# Patient Record
Sex: MALE | Race: WHITE | NOT HISPANIC OR LATINO | Employment: FULL TIME | URBAN - METROPOLITAN AREA
[De-identification: names, ages, dates, MRNs, and addresses within clinical notes are randomized per-mention and may not be internally consistent; named-entity substitution may affect disease eponyms.]

---

## 2021-01-10 ENCOUNTER — HOSPITAL ENCOUNTER (EMERGENCY)
Facility: HOSPITAL | Age: 61
Discharge: HOME | End: 2021-01-10
Attending: EMERGENCY MEDICINE | Admitting: EMERGENCY MEDICINE

## 2021-01-10 VITALS
RESPIRATION RATE: 14 BRPM | SYSTOLIC BLOOD PRESSURE: 153 MMHG | TEMPERATURE: 97.9 F | HEART RATE: 75 BPM | HEIGHT: 69 IN | DIASTOLIC BLOOD PRESSURE: 99 MMHG | OXYGEN SATURATION: 96 % | BODY MASS INDEX: 28.41 KG/M2 | WEIGHT: 191.8 LBS

## 2021-01-10 DIAGNOSIS — I10 UNCONTROLLED HYPERTENSION: Primary | ICD-10-CM

## 2021-01-10 LAB
ANION GAP SERPL CALCULATED.3IONS-SCNC: 9 MMOL/L (ref 5–15)
BASOPHILS # BLD AUTO: 0.1 10*3/MM3 (ref 0–0.2)
BASOPHILS NFR BLD AUTO: 0.7 % (ref 0–1.5)
BUN SERPL-MCNC: 15 MG/DL (ref 6–20)
BUN/CREAT SERPL: 14.3 (ref 7–25)
CALCIUM SPEC-SCNC: 9.3 MG/DL (ref 8.6–10.5)
CHLORIDE SERPL-SCNC: 100 MMOL/L (ref 98–107)
CO2 SERPL-SCNC: 28 MMOL/L (ref 22–29)
CREAT SERPL-MCNC: 1.05 MG/DL (ref 0.76–1.27)
DEPRECATED RDW RBC AUTO: 46.4 FL (ref 37–54)
EOSINOPHIL # BLD AUTO: 0.1 10*3/MM3 (ref 0–0.4)
EOSINOPHIL NFR BLD AUTO: 1.6 % (ref 0.3–6.2)
ERYTHROCYTE [DISTWIDTH] IN BLOOD BY AUTOMATED COUNT: 14.1 % (ref 12.3–15.4)
GFR SERPL CREATININE-BSD FRML MDRD: 73 ML/MIN/1.73
GLUCOSE SERPL-MCNC: 123 MG/DL (ref 65–99)
HCT VFR BLD AUTO: 52.2 % (ref 37.5–51)
HGB BLD-MCNC: 17.6 G/DL (ref 13–17.7)
LYMPHOCYTES # BLD AUTO: 2 10*3/MM3 (ref 0.7–3.1)
LYMPHOCYTES NFR BLD AUTO: 23.7 % (ref 19.6–45.3)
MCH RBC QN AUTO: 31.9 PG (ref 26.6–33)
MCHC RBC AUTO-ENTMCNC: 33.7 G/DL (ref 31.5–35.7)
MCV RBC AUTO: 94.7 FL (ref 79–97)
MONOCYTES # BLD AUTO: 0.7 10*3/MM3 (ref 0.1–0.9)
MONOCYTES NFR BLD AUTO: 8.1 % (ref 5–12)
NEUTROPHILS NFR BLD AUTO: 5.5 10*3/MM3 (ref 1.7–7)
NEUTROPHILS NFR BLD AUTO: 65.9 % (ref 42.7–76)
NRBC BLD AUTO-RTO: 0 /100 WBC (ref 0–0.2)
PLATELET # BLD AUTO: 249 10*3/MM3 (ref 140–450)
PMV BLD AUTO: 8.3 FL (ref 6–12)
POTASSIUM SERPL-SCNC: 4.2 MMOL/L (ref 3.5–5.2)
RBC # BLD AUTO: 5.51 10*6/MM3 (ref 4.14–5.8)
SODIUM SERPL-SCNC: 137 MMOL/L (ref 136–145)
TROPONIN T SERPL-MCNC: <0.01 NG/ML (ref 0–0.03)
WBC # BLD AUTO: 8.3 10*3/MM3 (ref 3.4–10.8)

## 2021-01-10 PROCEDURE — 85025 COMPLETE CBC W/AUTO DIFF WBC: CPT | Performed by: EMERGENCY MEDICINE

## 2021-01-10 PROCEDURE — 84484 ASSAY OF TROPONIN QUANT: CPT | Performed by: EMERGENCY MEDICINE

## 2021-01-10 PROCEDURE — 99284 EMERGENCY DEPT VISIT MOD MDM: CPT

## 2021-01-10 PROCEDURE — 80048 BASIC METABOLIC PNL TOTAL CA: CPT | Performed by: EMERGENCY MEDICINE

## 2021-01-10 PROCEDURE — 93005 ELECTROCARDIOGRAM TRACING: CPT | Performed by: EMERGENCY MEDICINE

## 2021-01-10 RX ORDER — SODIUM CHLORIDE 0.9 % (FLUSH) 0.9 %
10 SYRINGE (ML) INJECTION AS NEEDED
Status: DISCONTINUED | OUTPATIENT
Start: 2021-01-10 | End: 2021-01-10 | Stop reason: HOSPADM

## 2021-01-10 RX ADMIN — METOPROLOL TARTRATE 25 MG: 25 TABLET, FILM COATED ORAL at 16:23

## 2021-01-10 NOTE — DISCHARGE INSTRUCTIONS
Follow-up with your doctor as scheduled.  Record readings of your blood pressure to report to your doctor at the next visit.  Return for increased pain, shortness of breath, numbness, weakness or any other concerns

## 2021-01-10 NOTE — ED PROVIDER NOTES
"Subjective   History of Present Illness  Elevated blood pressure  60-year-old male states his blood pressure has been out of control.  He has noted some intermittent frontal headaches described as pressure in his head and sometimes throbbing off and on over the last several months as well as some sharp anterior chest pain that he describes as brief and intermittent over the last several months without relieving or exacerbating factors.  States he had both discomforts yesterday and took his blood pressure and it was in the 200s over 100s.  He states he has had history of hypertension but has been off of medication for quite some time and has had no primary care over the last several years.  He reports no thunderclap onset or dyspnea on exertion or exertional chest pain or diaphoresis.  He denies history of coronary artery disease.  He currently denies active headache or chest pain.  Review of Systems   Constitutional: Negative.  Negative for diaphoresis and fever.   Eyes: Negative.    Respiratory: Negative.    Cardiovascular: Positive for chest pain.   Gastrointestinal: Negative.    Genitourinary: Negative.    Musculoskeletal: Negative.    Skin: Negative.    Neurological: Positive for headaches. Negative for dizziness, syncope and weakness.   Psychiatric/Behavioral: Negative.        No past medical history on file.  Hypertension  No Known Allergies    No past surgical history on file.    No family history on file.  Family history of hypertension  Social History     Socioeconomic History   • Marital status: Significant Other     Spouse name: Not on file   • Number of children: Not on file   • Years of education: Not on file   • Highest education level: Not on file     Social history occasionally smokes marijuana; he denies cocaine or amphetamine use  Prior to Admission medications    Not on File     /93   Pulse 93   Temp 97.4 °F (36.3 °C)   Resp 16   Ht 175.3 cm (69\")   Wt 87 kg (191 lb 12.8 oz)   SpO2 96% "   BMI 28.32 kg/m²   I examined the patient using the appropriate personal protective equipment.        Objective   Physical Exam  General: Well-developed well-appearing, no acute distress, alert and appropriate  Eyes: Pupils round and reactive, sclera nonicteric  HEENT: Mucous membranes moist, no mucosal swelling  Neck: Supple, no nuchal rigidity, no lymphadenopathy  Respirations: Respirations nonlabored, equal breath sounds bilaterally, clear lungs  Heart regular rate and rhythm, no murmurs rubs or gallops,   Abdomen soft nontender nondistended, no hepatosplenomegaly, no hernia, no mass, normal bowel sounds,  Extremities no clubbing cyanosis or edema, calves are symmetric and nontender  Neuro cranial nerves grossly intact, no focal limb deficits  Psych oriented, pleasant affect  Skin no rash, brisk cap refill  Procedures           ED Course      My EKG interpretation sinus rhythm, PVC, rate of 83     Results for orders placed or performed during the hospital encounter of 01/10/21   Basic Metabolic Panel    Specimen: Blood   Result Value Ref Range    Glucose 123 (H) 65 - 99 mg/dL    BUN 15 6 - 20 mg/dL    Creatinine 1.05 0.76 - 1.27 mg/dL    Sodium 137 136 - 145 mmol/L    Potassium 4.2 3.5 - 5.2 mmol/L    Chloride 100 98 - 107 mmol/L    CO2 28.0 22.0 - 29.0 mmol/L    Calcium 9.3 8.6 - 10.5 mg/dL    eGFR Non African Amer 73 >60 mL/min/1.73    BUN/Creatinine Ratio 14.3 7.0 - 25.0    Anion Gap 9.0 5.0 - 15.0 mmol/L   Troponin    Specimen: Blood   Result Value Ref Range    Troponin T <0.010 0.000 - 0.030 ng/mL   CBC Auto Differential    Specimen: Blood   Result Value Ref Range    WBC 8.30 3.40 - 10.80 10*3/mm3    RBC 5.51 4.14 - 5.80 10*6/mm3    Hemoglobin 17.6 13.0 - 17.7 g/dL    Hematocrit 52.2 (H) 37.5 - 51.0 %    MCV 94.7 79.0 - 97.0 fL    MCH 31.9 26.6 - 33.0 pg    MCHC 33.7 31.5 - 35.7 g/dL    RDW 14.1 12.3 - 15.4 %    RDW-SD 46.4 37.0 - 54.0 fl    MPV 8.3 6.0 - 12.0 fL    Platelets 249 140 - 450 10*3/mm3     Neutrophil % 65.9 42.7 - 76.0 %    Lymphocyte % 23.7 19.6 - 45.3 %    Monocyte % 8.1 5.0 - 12.0 %    Eosinophil % 1.6 0.3 - 6.2 %    Basophil % 0.7 0.0 - 1.5 %    Neutrophils, Absolute 5.50 1.70 - 7.00 10*3/mm3    Lymphocytes, Absolute 2.00 0.70 - 3.10 10*3/mm3    Monocytes, Absolute 0.70 0.10 - 0.90 10*3/mm3    Eosinophils, Absolute 0.10 0.00 - 0.40 10*3/mm3    Basophils, Absolute 0.10 0.00 - 0.20 10*3/mm3    nRBC 0.0 0.0 - 0.2 /100 WBC   ECG 12 Lead   Result Value Ref Range    QT Interval 393 ms                                     MDM  Patient is describing symptoms of some uncontrolled hypertension.  He is not describing active chest pain or headache on this evaluation.  He is not describing symptoms of TIA or stroke.  His EKG shows no acute ischemic change and his troponin was normal.  He was given metoprolol his blood pressure was improving is resting comfortably on reexamination.  He states he does have a scheduled appointment with a primary care physician on the 20th of this month.  He is prescribed metoprolol 25 mg twice daily he is discharged good condition and given warning signs for return.  He voiced understanding to the plan.  Final diagnoses:   Uncontrolled hypertension            Boy Lamb MD  01/10/21 5259

## 2021-01-12 LAB — QT INTERVAL: 393 MS

## 2021-12-14 ENCOUNTER — HOSPITAL ENCOUNTER (INPATIENT)
Facility: HOSPITAL | Age: 61
LOS: 2 days | Discharge: HOME | End: 2021-12-16
Attending: EMERGENCY MEDICINE | Admitting: HOSPITALIST

## 2021-12-14 ENCOUNTER — APPOINTMENT (OUTPATIENT)
Dept: CT IMAGING | Facility: HOSPITAL | Age: 61
End: 2021-12-14

## 2021-12-14 ENCOUNTER — APPOINTMENT (OUTPATIENT)
Dept: GENERAL RADIOLOGY | Facility: HOSPITAL | Age: 61
End: 2021-12-14

## 2021-12-14 DIAGNOSIS — R09.02 HYPOXIA: ICD-10-CM

## 2021-12-14 DIAGNOSIS — R53.1 WEAKNESS: ICD-10-CM

## 2021-12-14 DIAGNOSIS — U07.1 PNEUMONIA DUE TO COVID-19 VIRUS: Primary | ICD-10-CM

## 2021-12-14 DIAGNOSIS — J12.82 PNEUMONIA DUE TO COVID-19 VIRUS: Primary | ICD-10-CM

## 2021-12-14 PROBLEM — I10 HYPERTENSIVE DISORDER: Status: ACTIVE | Noted: 2021-01-20

## 2021-12-14 PROBLEM — E87.1 HYPONATREMIA: Status: ACTIVE | Noted: 2021-12-14

## 2021-12-14 PROBLEM — H16.001 CORNEAL ULCER OF RIGHT EYE: Status: ACTIVE | Noted: 2021-08-05

## 2021-12-14 PROBLEM — B35.1 ONYCHOMYCOSIS: Status: ACTIVE | Noted: 2021-04-01

## 2021-12-14 PROBLEM — E83.51 HYPOCALCEMIA: Status: ACTIVE | Noted: 2021-12-14

## 2021-12-14 LAB
ALBUMIN SERPL-MCNC: 3.4 G/DL (ref 3.5–5.2)
ALBUMIN/GLOB SERPL: 1 G/DL
ALP SERPL-CCNC: 107 U/L (ref 39–117)
ALT SERPL W P-5'-P-CCNC: 41 U/L (ref 1–41)
ANION GAP SERPL CALCULATED.3IONS-SCNC: 13 MMOL/L (ref 5–15)
AST SERPL-CCNC: 46 U/L (ref 1–40)
BASOPHILS # BLD AUTO: 0 10*3/MM3 (ref 0–0.2)
BASOPHILS NFR BLD AUTO: 0.3 % (ref 0–1.5)
BILIRUB SERPL-MCNC: 0.7 MG/DL (ref 0–1.2)
BUN SERPL-MCNC: 11 MG/DL (ref 6–20)
BUN/CREAT SERPL: 15.1 (ref 7–25)
CA-I SERPL ISE-MCNC: 1.11 MMOL/L (ref 1.2–1.3)
CALCIUM SPEC-SCNC: 8.3 MG/DL (ref 8.6–10.5)
CHLORIDE SERPL-SCNC: 93 MMOL/L (ref 98–107)
CHOLEST SERPL-MCNC: 96 MG/DL (ref 0–200)
CO2 SERPL-SCNC: 25 MMOL/L (ref 22–29)
CREAT SERPL-MCNC: 0.73 MG/DL (ref 0.76–1.27)
CRP SERPL-MCNC: 11.44 MG/DL (ref 0–0.5)
D DIMER PPP FEU-MCNC: 0.93 MG/L (FEU) (ref 0–0.59)
D-LACTATE SERPL-SCNC: 1.3 MMOL/L (ref 0.5–2)
DEPRECATED RDW RBC AUTO: 52.5 FL (ref 37–54)
EOSINOPHIL # BLD AUTO: 0 10*3/MM3 (ref 0–0.4)
EOSINOPHIL NFR BLD AUTO: 0 % (ref 0.3–6.2)
ERYTHROCYTE [DISTWIDTH] IN BLOOD BY AUTOMATED COUNT: 15.6 % (ref 12.3–15.4)
FERRITIN SERPL-MCNC: 920.7 NG/ML (ref 30–400)
GFR SERPL CREATININE-BSD FRML MDRD: 110 ML/MIN/1.73
GLOBULIN UR ELPH-MCNC: 3.3 GM/DL
GLUCOSE SERPL-MCNC: 97 MG/DL (ref 65–99)
HCT VFR BLD AUTO: 55.9 % (ref 37.5–51)
HDLC SERPL-MCNC: 38 MG/DL (ref 40–60)
HGB BLD-MCNC: 18.9 G/DL (ref 13–17.7)
HOLD SPECIMEN: NORMAL
LDLC SERPL CALC-MCNC: 44 MG/DL (ref 0–100)
LDLC/HDLC SERPL: 1.22 {RATIO}
LYMPHOCYTES # BLD AUTO: 0.7 10*3/MM3 (ref 0.7–3.1)
LYMPHOCYTES NFR BLD AUTO: 13.4 % (ref 19.6–45.3)
MAGNESIUM SERPL-MCNC: 2.2 MG/DL (ref 1.6–2.6)
MAGNESIUM SERPL-MCNC: 2.3 MG/DL (ref 1.6–2.6)
MCH RBC QN AUTO: 33.1 PG (ref 26.6–33)
MCHC RBC AUTO-ENTMCNC: 33.8 G/DL (ref 31.5–35.7)
MCV RBC AUTO: 97.9 FL (ref 79–97)
MONOCYTES # BLD AUTO: 0.7 10*3/MM3 (ref 0.1–0.9)
MONOCYTES NFR BLD AUTO: 14.2 % (ref 5–12)
NEUTROPHILS NFR BLD AUTO: 3.5 10*3/MM3 (ref 1.7–7)
NEUTROPHILS NFR BLD AUTO: 72.1 % (ref 42.7–76)
NRBC BLD AUTO-RTO: 0.1 /100 WBC (ref 0–0.2)
OSMOLALITY SERPL: 271 MOSM/KG (ref 275–295)
PLAT MORPH BLD: NORMAL
PLATELET # BLD AUTO: 194 10*3/MM3 (ref 140–450)
PMV BLD AUTO: 8.2 FL (ref 6–12)
POTASSIUM SERPL-SCNC: 5 MMOL/L (ref 3.5–5.2)
PROCALCITONIN SERPL-MCNC: 0.06 NG/ML (ref 0–0.25)
PROT SERPL-MCNC: 6.7 G/DL (ref 6–8.5)
RBC # BLD AUTO: 5.71 10*6/MM3 (ref 4.14–5.8)
RBC MORPH BLD: NORMAL
SARS-COV-2 RNA PNL SPEC NAA+PROBE: DETECTED
SODIUM SERPL-SCNC: 131 MMOL/L (ref 136–145)
TRIGL SERPL-MCNC: 59 MG/DL (ref 0–150)
TROPONIN T SERPL-MCNC: <0.01 NG/ML (ref 0–0.03)
TROPONIN T SERPL-MCNC: <0.01 NG/ML (ref 0–0.03)
VLDLC SERPL-MCNC: 14 MG/DL (ref 5–40)
WBC MORPH BLD: NORMAL
WBC NRBC COR # BLD: 4.9 10*3/MM3 (ref 3.4–10.8)
WHOLE BLOOD HOLD SPECIMEN: NORMAL

## 2021-12-14 PROCEDURE — 96375 TX/PRO/DX INJ NEW DRUG ADDON: CPT

## 2021-12-14 PROCEDURE — 85379 FIBRIN DEGRADATION QUANT: CPT | Performed by: NURSE PRACTITIONER

## 2021-12-14 PROCEDURE — 82728 ASSAY OF FERRITIN: CPT | Performed by: NURSE PRACTITIONER

## 2021-12-14 PROCEDURE — 25010000002 ENOXAPARIN PER 10 MG: Performed by: NURSE PRACTITIONER

## 2021-12-14 PROCEDURE — 99284 EMERGENCY DEPT VISIT MOD MDM: CPT

## 2021-12-14 PROCEDURE — 83605 ASSAY OF LACTIC ACID: CPT | Performed by: NURSE PRACTITIONER

## 2021-12-14 PROCEDURE — 86140 C-REACTIVE PROTEIN: CPT | Performed by: NURSE PRACTITIONER

## 2021-12-14 PROCEDURE — 93005 ELECTROCARDIOGRAM TRACING: CPT | Performed by: EMERGENCY MEDICINE

## 2021-12-14 PROCEDURE — 99221 1ST HOSP IP/OBS SF/LOW 40: CPT | Performed by: NURSE PRACTITIONER

## 2021-12-14 PROCEDURE — 96372 THER/PROPH/DIAG INJ SC/IM: CPT

## 2021-12-14 PROCEDURE — 84484 ASSAY OF TROPONIN QUANT: CPT | Performed by: EMERGENCY MEDICINE

## 2021-12-14 PROCEDURE — 82330 ASSAY OF CALCIUM: CPT | Performed by: NURSE PRACTITIONER

## 2021-12-14 PROCEDURE — 25010000002 DEXAMETHASONE PER 1 MG: Performed by: EMERGENCY MEDICINE

## 2021-12-14 PROCEDURE — 70450 CT HEAD/BRAIN W/O DYE: CPT

## 2021-12-14 PROCEDURE — 80061 LIPID PANEL: CPT | Performed by: NURSE PRACTITIONER

## 2021-12-14 PROCEDURE — G0378 HOSPITAL OBSERVATION PER HR: HCPCS

## 2021-12-14 PROCEDURE — 85025 COMPLETE CBC W/AUTO DIFF WBC: CPT | Performed by: EMERGENCY MEDICINE

## 2021-12-14 PROCEDURE — 71045 X-RAY EXAM CHEST 1 VIEW: CPT

## 2021-12-14 PROCEDURE — 83735 ASSAY OF MAGNESIUM: CPT | Performed by: NURSE PRACTITIONER

## 2021-12-14 PROCEDURE — 84484 ASSAY OF TROPONIN QUANT: CPT | Performed by: NURSE PRACTITIONER

## 2021-12-14 PROCEDURE — 83930 ASSAY OF BLOOD OSMOLALITY: CPT | Performed by: NURSE PRACTITIONER

## 2021-12-14 PROCEDURE — 36415 COLL VENOUS BLD VENIPUNCTURE: CPT | Performed by: NURSE PRACTITIONER

## 2021-12-14 PROCEDURE — 84145 PROCALCITONIN (PCT): CPT | Performed by: NURSE PRACTITIONER

## 2021-12-14 PROCEDURE — 87040 BLOOD CULTURE FOR BACTERIA: CPT | Performed by: NURSE PRACTITIONER

## 2021-12-14 PROCEDURE — 87635 SARS-COV-2 COVID-19 AMP PRB: CPT | Performed by: EMERGENCY MEDICINE

## 2021-12-14 PROCEDURE — 85007 BL SMEAR W/DIFF WBC COUNT: CPT | Performed by: EMERGENCY MEDICINE

## 2021-12-14 PROCEDURE — 80053 COMPREHEN METABOLIC PANEL: CPT | Performed by: EMERGENCY MEDICINE

## 2021-12-14 RX ORDER — DEXAMETHASONE SODIUM PHOSPHATE 4 MG/ML
6 INJECTION, SOLUTION INTRA-ARTICULAR; INTRALESIONAL; INTRAMUSCULAR; INTRAVENOUS; SOFT TISSUE DAILY
Status: DISCONTINUED | OUTPATIENT
Start: 2021-12-15 | End: 2021-12-16 | Stop reason: HOSPADM

## 2021-12-14 RX ORDER — DEXAMETHASONE SODIUM PHOSPHATE 4 MG/ML
6 INJECTION, SOLUTION INTRA-ARTICULAR; INTRALESIONAL; INTRAMUSCULAR; INTRAVENOUS; SOFT TISSUE ONCE
Status: COMPLETED | OUTPATIENT
Start: 2021-12-14 | End: 2021-12-14

## 2021-12-14 RX ORDER — ONDANSETRON 4 MG/1
4 TABLET, FILM COATED ORAL EVERY 6 HOURS PRN
Status: DISCONTINUED | OUTPATIENT
Start: 2021-12-14 | End: 2021-12-16 | Stop reason: HOSPADM

## 2021-12-14 RX ORDER — ALBUTEROL SULFATE 90 UG/1
2 AEROSOL, METERED RESPIRATORY (INHALATION) EVERY 6 HOURS PRN
Status: DISCONTINUED | OUTPATIENT
Start: 2021-12-14 | End: 2021-12-16 | Stop reason: HOSPADM

## 2021-12-14 RX ORDER — SODIUM CHLORIDE 0.9 % (FLUSH) 0.9 %
10 SYRINGE (ML) INJECTION AS NEEDED
Status: DISCONTINUED | OUTPATIENT
Start: 2021-12-14 | End: 2021-12-16 | Stop reason: HOSPADM

## 2021-12-14 RX ORDER — GUAIFENESIN/DEXTROMETHORPHAN 100-10MG/5
10 SYRUP ORAL EVERY 4 HOURS PRN
Status: DISCONTINUED | OUTPATIENT
Start: 2021-12-14 | End: 2021-12-16 | Stop reason: HOSPADM

## 2021-12-14 RX ORDER — POTASSIUM CHLORIDE 20 MEQ/1
40 TABLET, EXTENDED RELEASE ORAL AS NEEDED
Status: DISCONTINUED | OUTPATIENT
Start: 2021-12-14 | End: 2021-12-16 | Stop reason: HOSPADM

## 2021-12-14 RX ORDER — MAGNESIUM SULFATE 1 G/100ML
1 INJECTION INTRAVENOUS AS NEEDED
Status: DISCONTINUED | OUTPATIENT
Start: 2021-12-14 | End: 2021-12-16 | Stop reason: HOSPADM

## 2021-12-14 RX ORDER — POTASSIUM CHLORIDE 1.5 G/1.77G
40 POWDER, FOR SOLUTION ORAL AS NEEDED
Status: DISCONTINUED | OUTPATIENT
Start: 2021-12-14 | End: 2021-12-16 | Stop reason: HOSPADM

## 2021-12-14 RX ORDER — NITROGLYCERIN 0.4 MG/1
0.4 TABLET SUBLINGUAL
Status: DISCONTINUED | OUTPATIENT
Start: 2021-12-14 | End: 2021-12-16 | Stop reason: HOSPADM

## 2021-12-14 RX ORDER — BENZONATATE 100 MG/1
100 CAPSULE ORAL 3 TIMES DAILY PRN
Status: DISCONTINUED | OUTPATIENT
Start: 2021-12-14 | End: 2021-12-16 | Stop reason: HOSPADM

## 2021-12-14 RX ORDER — SODIUM CHLORIDE 0.9 % (FLUSH) 0.9 %
10 SYRINGE (ML) INJECTION EVERY 12 HOURS SCHEDULED
Status: DISCONTINUED | OUTPATIENT
Start: 2021-12-14 | End: 2021-12-16 | Stop reason: HOSPADM

## 2021-12-14 RX ORDER — ACETAMINOPHEN 325 MG/1
650 TABLET ORAL EVERY 4 HOURS PRN
Status: DISCONTINUED | OUTPATIENT
Start: 2021-12-14 | End: 2021-12-16 | Stop reason: HOSPADM

## 2021-12-14 RX ORDER — MAGNESIUM SULFATE HEPTAHYDRATE 40 MG/ML
2 INJECTION, SOLUTION INTRAVENOUS AS NEEDED
Status: DISCONTINUED | OUTPATIENT
Start: 2021-12-14 | End: 2021-12-16 | Stop reason: HOSPADM

## 2021-12-14 RX ORDER — ACETAMINOPHEN 160 MG/5ML
650 SOLUTION ORAL EVERY 4 HOURS PRN
Status: DISCONTINUED | OUTPATIENT
Start: 2021-12-14 | End: 2021-12-16 | Stop reason: HOSPADM

## 2021-12-14 RX ORDER — ACETAMINOPHEN 650 MG/1
650 SUPPOSITORY RECTAL EVERY 4 HOURS PRN
Status: DISCONTINUED | OUTPATIENT
Start: 2021-12-14 | End: 2021-12-16 | Stop reason: HOSPADM

## 2021-12-14 RX ORDER — DEXAMETHASONE 4 MG/1
6 TABLET ORAL DAILY
Status: DISCONTINUED | OUTPATIENT
Start: 2021-12-15 | End: 2021-12-16 | Stop reason: HOSPADM

## 2021-12-14 RX ORDER — ONDANSETRON 2 MG/ML
4 INJECTION INTRAMUSCULAR; INTRAVENOUS EVERY 6 HOURS PRN
Status: DISCONTINUED | OUTPATIENT
Start: 2021-12-14 | End: 2021-12-16 | Stop reason: HOSPADM

## 2021-12-14 RX ADMIN — SODIUM CHLORIDE, PRESERVATIVE FREE 10 ML: 5 INJECTION INTRAVENOUS at 21:18

## 2021-12-14 RX ADMIN — DEXAMETHASONE SODIUM PHOSPHATE 6 MG: 4 INJECTION, SOLUTION INTRA-ARTICULAR; INTRALESIONAL; INTRAMUSCULAR; INTRAVENOUS; SOFT TISSUE at 18:55

## 2021-12-14 RX ADMIN — ENOXAPARIN SODIUM 40 MG: 40 INJECTION SUBCUTANEOUS at 21:17

## 2021-12-14 RX ADMIN — SODIUM CHLORIDE 1000 ML: 9 INJECTION, SOLUTION INTRAVENOUS at 16:28

## 2021-12-14 NOTE — ED PROVIDER NOTES
"Subjective   History of Present Illness  Exposure to Covid, general weakness  61-year-old male states had some increasing general weakness, decreased appetite decreased smell and taste over the last 2 days after his wife had Covid last week.  He states he felt slightly feverish.  States he is also had some difficulty walking due to some weakness mainly on the left side arm and leg weakness for the last 2 days.  He reports no stiff neck or photophobia.  He has had cough without shortness of breath.  Review of Systems   Constitutional: Positive for fatigue and fever.   HENT: Negative.    Eyes: Negative.    Respiratory: Positive for cough.    Cardiovascular: Negative.    Gastrointestinal: Negative.    Genitourinary: Negative.    Musculoskeletal: Negative.    Skin: Negative.    Neurological: Positive for weakness. Negative for headaches.   Psychiatric/Behavioral: Negative.        No past medical history on file.  Hypertension  No Known Allergies    No past surgical history on file.    No family history on file.    Social History     Socioeconomic History   • Marital status:    Uses vape    Prior to Admission medications    Medication Sig Start Date End Date Taking? Authorizing Provider   metoprolol tartrate (LOPRESSOR) 25 MG tablet Take 1 tablet by mouth 2 (Two) Times a Day. 1/10/21   Boy Lamb MD     /82 (BP Location: Left arm, Patient Position: Lying)   Pulse 82   Temp 98.9 °F (37.2 °C) (Oral)   Resp 18   Ht 182.9 cm (72\")   Wt 86.2 kg (190 lb)   SpO2 93%   BMI 25.77 kg/m²   I examined the patient using the appropriate personal protective equipment.            Objective   Physical Exam  General: Well-developed well-appearing, no acute distress, alert and appropriate  Eyes: Pupils round and reactive, sclera nonicteric  HEENT: Mucous membranes somewhat dry, no mucosal swelling  Neck: Supple, no nuchal rigidity, no lymphadenopathy  Respirations: Some coarse breath sounds bilaterally, " respirations mildly tachypneic  Heart regular rate and rhythm, no murmurs rubs or gallops,   Abdomen soft nontender nondistended, no hepatosplenomegaly, no hernia, no mass, normal bowel sounds, no CVA tenderness  Extremities no clubbing cyanosis or edema, calves are symmetric and nontender  Neuro cranial nerves normal bilaterally, equal strength in bilateral upper extremities, weakness in bilateral extremities   Psych oriented, pleasant affect  Skin no rash, brisk cap refill  Procedures           ED Course      Results for orders placed or performed during the hospital encounter of 12/14/21   COVID-19,CEPHEID/BIJAN,COR/DEQUAN/PAD/MATT IN-HOUSE(OR EMERGENT/ADD-ON),NP SWAB IN TRANSPORT MEDIA 3-4 HR TAT, RT-PCR - Swab, Nasopharynx    Specimen: Nasopharynx; Swab   Result Value Ref Range    COVID19 Detected (C) Not Detected - Ref. Range   Comprehensive Metabolic Panel    Specimen: Blood   Result Value Ref Range    Glucose 97 65 - 99 mg/dL    BUN 11 6 - 20 mg/dL    Creatinine 0.73 (L) 0.76 - 1.27 mg/dL    Sodium 131 (L) 136 - 145 mmol/L    Potassium 5.0 3.5 - 5.2 mmol/L    Chloride 93 (L) 98 - 107 mmol/L    CO2 25.0 22.0 - 29.0 mmol/L    Calcium 8.3 (L) 8.6 - 10.5 mg/dL    Total Protein 6.7 6.0 - 8.5 g/dL    Albumin 3.40 (L) 3.50 - 5.20 g/dL    ALT (SGPT) 41 1 - 41 U/L    AST (SGOT) 46 (H) 1 - 40 U/L    Alkaline Phosphatase 107 39 - 117 U/L    Total Bilirubin 0.7 0.0 - 1.2 mg/dL    eGFR Non African Amer 110 >60 mL/min/1.73    Globulin 3.3 gm/dL    A/G Ratio 1.0 g/dL    BUN/Creatinine Ratio 15.1 7.0 - 25.0    Anion Gap 13.0 5.0 - 15.0 mmol/L   Troponin    Specimen: Blood   Result Value Ref Range    Troponin T <0.010 0.000 - 0.030 ng/mL   CBC Auto Differential    Specimen: Blood   Result Value Ref Range    WBC 4.90 3.40 - 10.80 10*3/mm3    RBC 5.71 4.14 - 5.80 10*6/mm3    Hemoglobin 18.9 (H) 13.0 - 17.7 g/dL    Hematocrit 55.9 (H) 37.5 - 51.0 %    MCV 97.9 (H) 79.0 - 97.0 fL    MCH 33.1 (H) 26.6 - 33.0 pg    MCHC 33.8 31.5 -  35.7 g/dL    RDW 15.6 (H) 12.3 - 15.4 %    RDW-SD 52.5 37.0 - 54.0 fl    MPV 8.2 6.0 - 12.0 fL    Platelets 194 140 - 450 10*3/mm3    Neutrophil % 72.1 42.7 - 76.0 %    Lymphocyte % 13.4 (L) 19.6 - 45.3 %    Monocyte % 14.2 (H) 5.0 - 12.0 %    Eosinophil % 0.0 (L) 0.3 - 6.2 %    Basophil % 0.3 0.0 - 1.5 %    Neutrophils, Absolute 3.50 1.70 - 7.00 10*3/mm3    Lymphocytes, Absolute 0.70 0.70 - 3.10 10*3/mm3    Monocytes, Absolute 0.70 0.10 - 0.90 10*3/mm3    Eosinophils, Absolute 0.00 0.00 - 0.40 10*3/mm3    Basophils, Absolute 0.00 0.00 - 0.20 10*3/mm3    nRBC 0.1 0.0 - 0.2 /100 WBC   Scan Slide    Specimen: Blood   Result Value Ref Range    RBC Morphology Normal Normal    WBC Morphology Normal Normal    Platelet Morphology Normal Normal   ECG 12 Lead   Result Value Ref Range    QT Interval 387 ms   Gold Top - SST   Result Value Ref Range    Extra Tube Hold for add-ons.    Light Blue Top   Result Value Ref Range    Extra Tube hold for add-on      CT Head Without Contrast    Result Date: 12/14/2021  No acute intracranial abnormality. There is paranasal sinus opacification running involving the frontal, ethmoid and maxillary sinuses. Brain MRI is more sensitive to evaluate for acute or subacute infarcts and to evaluate for intracranial metastatic disease.  Electronically Signed By-Cheyenne Andersen MD On:12/14/2021 6:06 PM This report was finalized on [id number] by  Cheyenne Andersen MD.    XR Chest 1 View    Result Date: 12/14/2021  Elevation of the right hemidiaphragm with subtle peripheral hazy groundglass opacities and interstitial thickening in both lungs, likely atypical/viral pneumonia.  Electronically Signed By-Keenan Palma MD On:12/14/2021 5:07 PM This report was finalized on [id number] by  Keenan Palma MD.         My EKG interpretation sinus rhythm rate of 79                                      MDM  Upon my initial valuation patient was satting in the 88-89% range on room air and was placed on nasal cannula  support 2 L.  He was positive for Covid.  Chest x-ray shows Covid infiltrate.  CT scan of the head was ordered due to patient's description of some weakness of the left side greater than the right side however on neurologic exam he seems to be generally weak all over without focal or lateralizing deficits.  His CT scan of the head shows no acute intracranial normality.  Due to patient's hypoxia he will be admitted the hospital for further care for his Covid illness.  Notably he has not been vaccinated.  Hospital service was paged for admission.  Final diagnoses:   Pneumonia due to COVID-19 virus   Hypoxia   Weakness       ED Disposition  ED Disposition     ED Disposition Condition Comment    Decision to Admit  Level of Care: Telemetry [5]   Admitting Physician: HARLAN DELGADO [[id number]]            No follow-up provider specified.       Medication List      No changes were made to your prescriptions during this visit.          Boy Lamb MD  12/14/21 4853       Boy Lamb MD  12/14/21 0681

## 2021-12-15 ENCOUNTER — APPOINTMENT (OUTPATIENT)
Dept: CT IMAGING | Facility: HOSPITAL | Age: 61
End: 2021-12-15

## 2021-12-15 ENCOUNTER — APPOINTMENT (OUTPATIENT)
Dept: MRI IMAGING | Facility: HOSPITAL | Age: 61
End: 2021-12-15

## 2021-12-15 ENCOUNTER — APPOINTMENT (OUTPATIENT)
Dept: CARDIOLOGY | Facility: HOSPITAL | Age: 61
End: 2021-12-15

## 2021-12-15 LAB
ALBUMIN SERPL-MCNC: 3.1 G/DL (ref 3.5–5.2)
ALBUMIN/GLOB SERPL: 0.9 G/DL
ALP SERPL-CCNC: 103 U/L (ref 39–117)
ALT SERPL W P-5'-P-CCNC: 38 U/L (ref 1–41)
ANION GAP SERPL CALCULATED.3IONS-SCNC: 12 MMOL/L (ref 5–15)
APTT PPP: 32 SECONDS (ref 24–31)
AST SERPL-CCNC: 45 U/L (ref 1–40)
BASOPHILS # BLD AUTO: 0 10*3/MM3 (ref 0–0.2)
BASOPHILS NFR BLD AUTO: 0.3 % (ref 0–1.5)
BH CV LOWER VASCULAR LEFT COMMON FEMORAL AUGMENT: NORMAL
BH CV LOWER VASCULAR LEFT COMMON FEMORAL COMPETENT: NORMAL
BH CV LOWER VASCULAR LEFT COMMON FEMORAL COMPRESS: NORMAL
BH CV LOWER VASCULAR LEFT COMMON FEMORAL PHASIC: NORMAL
BH CV LOWER VASCULAR LEFT COMMON FEMORAL SPONT: NORMAL
BH CV LOWER VASCULAR LEFT DISTAL FEMORAL COMPRESS: NORMAL
BH CV LOWER VASCULAR LEFT GASTRONEMIUS COMPRESS: NORMAL
BH CV LOWER VASCULAR LEFT GREATER SAPH AK COMPRESS: NORMAL
BH CV LOWER VASCULAR LEFT GREATER SAPH BK COMPRESS: NORMAL
BH CV LOWER VASCULAR LEFT LESSER SAPH COMPRESS: NORMAL
BH CV LOWER VASCULAR LEFT MID FEMORAL AUGMENT: NORMAL
BH CV LOWER VASCULAR LEFT MID FEMORAL COMPETENT: NORMAL
BH CV LOWER VASCULAR LEFT MID FEMORAL COMPRESS: NORMAL
BH CV LOWER VASCULAR LEFT MID FEMORAL PHASIC: NORMAL
BH CV LOWER VASCULAR LEFT MID FEMORAL SPONT: NORMAL
BH CV LOWER VASCULAR LEFT PERONEAL COMPRESS: NORMAL
BH CV LOWER VASCULAR LEFT POPLITEAL AUGMENT: NORMAL
BH CV LOWER VASCULAR LEFT POPLITEAL COMPETENT: NORMAL
BH CV LOWER VASCULAR LEFT POPLITEAL COMPRESS: NORMAL
BH CV LOWER VASCULAR LEFT POPLITEAL PHASIC: NORMAL
BH CV LOWER VASCULAR LEFT POPLITEAL SPONT: NORMAL
BH CV LOWER VASCULAR LEFT POSTERIOR TIBIAL COMPRESS: NORMAL
BH CV LOWER VASCULAR LEFT PROXIMAL FEMORAL COMPRESS: NORMAL
BH CV LOWER VASCULAR LEFT SAPHENOFEMORAL JUNCTION COMPRESS: NORMAL
BH CV LOWER VASCULAR RIGHT COMMON FEMORAL AUGMENT: NORMAL
BH CV LOWER VASCULAR RIGHT COMMON FEMORAL COMPETENT: NORMAL
BH CV LOWER VASCULAR RIGHT COMMON FEMORAL COMPRESS: NORMAL
BH CV LOWER VASCULAR RIGHT COMMON FEMORAL PHASIC: NORMAL
BH CV LOWER VASCULAR RIGHT COMMON FEMORAL SPONT: NORMAL
BH CV LOWER VASCULAR RIGHT DISTAL FEMORAL COMPRESS: NORMAL
BH CV LOWER VASCULAR RIGHT GASTRONEMIUS COMPRESS: NORMAL
BH CV LOWER VASCULAR RIGHT GREATER SAPH AK COMPRESS: NORMAL
BH CV LOWER VASCULAR RIGHT GREATER SAPH BK COMPRESS: NORMAL
BH CV LOWER VASCULAR RIGHT LESSER SAPH COMPRESS: NORMAL
BH CV LOWER VASCULAR RIGHT MID FEMORAL AUGMENT: NORMAL
BH CV LOWER VASCULAR RIGHT MID FEMORAL COMPETENT: NORMAL
BH CV LOWER VASCULAR RIGHT MID FEMORAL COMPRESS: NORMAL
BH CV LOWER VASCULAR RIGHT MID FEMORAL PHASIC: NORMAL
BH CV LOWER VASCULAR RIGHT MID FEMORAL SPONT: NORMAL
BH CV LOWER VASCULAR RIGHT PERONEAL COMPRESS: NORMAL
BH CV LOWER VASCULAR RIGHT POPLITEAL AUGMENT: NORMAL
BH CV LOWER VASCULAR RIGHT POPLITEAL COMPETENT: NORMAL
BH CV LOWER VASCULAR RIGHT POPLITEAL COMPRESS: NORMAL
BH CV LOWER VASCULAR RIGHT POPLITEAL PHASIC: NORMAL
BH CV LOWER VASCULAR RIGHT POPLITEAL SPONT: NORMAL
BH CV LOWER VASCULAR RIGHT POSTERIOR TIBIAL COMPRESS: NORMAL
BH CV LOWER VASCULAR RIGHT PROXIMAL FEMORAL COMPRESS: NORMAL
BH CV LOWER VASCULAR RIGHT SAPHENOFEMORAL JUNCTION COMPRESS: NORMAL
BILIRUB SERPL-MCNC: 0.6 MG/DL (ref 0–1.2)
BUN SERPL-MCNC: 13 MG/DL (ref 6–20)
BUN/CREAT SERPL: 19.7 (ref 7–25)
CALCIUM SPEC-SCNC: 8.3 MG/DL (ref 8.6–10.5)
CHLORIDE SERPL-SCNC: 98 MMOL/L (ref 98–107)
CO2 SERPL-SCNC: 23 MMOL/L (ref 22–29)
CREAT SERPL-MCNC: 0.66 MG/DL (ref 0.76–1.27)
DEPRECATED RDW RBC AUTO: 50.8 FL (ref 37–54)
EOSINOPHIL # BLD AUTO: 0 10*3/MM3 (ref 0–0.4)
EOSINOPHIL NFR BLD AUTO: 0 % (ref 0.3–6.2)
ERYTHROCYTE [DISTWIDTH] IN BLOOD BY AUTOMATED COUNT: 15.2 % (ref 12.3–15.4)
FERRITIN SERPL-MCNC: 881 NG/ML (ref 30–400)
GFR SERPL CREATININE-BSD FRML MDRD: 124 ML/MIN/1.73
GLOBULIN UR ELPH-MCNC: 3.6 GM/DL
GLUCOSE SERPL-MCNC: 113 MG/DL (ref 65–99)
HCT VFR BLD AUTO: 56.4 % (ref 37.5–51)
HGB BLD-MCNC: 19 G/DL (ref 13–17.7)
INR PPP: 0.96 (ref 0.93–1.1)
L PNEUMO1 AG UR QL IA: NEGATIVE
LDH SERPL-CCNC: 287 U/L (ref 135–225)
LYMPHOCYTES # BLD AUTO: 0.7 10*3/MM3 (ref 0.7–3.1)
LYMPHOCYTES NFR BLD AUTO: 21.1 % (ref 19.6–45.3)
MACROCYTES BLD QL SMEAR: NORMAL
MAGNESIUM SERPL-MCNC: 2.4 MG/DL (ref 1.6–2.6)
MAXIMAL PREDICTED HEART RATE: 161 BPM
MCH RBC QN AUTO: 33.1 PG (ref 26.6–33)
MCHC RBC AUTO-ENTMCNC: 33.7 G/DL (ref 31.5–35.7)
MCV RBC AUTO: 98 FL (ref 79–97)
MONOCYTES # BLD AUTO: 0.6 10*3/MM3 (ref 0.1–0.9)
MONOCYTES NFR BLD AUTO: 16.6 % (ref 5–12)
NEUTROPHILS NFR BLD AUTO: 2.2 10*3/MM3 (ref 1.7–7)
NEUTROPHILS NFR BLD AUTO: 62 % (ref 42.7–76)
NRBC BLD AUTO-RTO: 0.2 /100 WBC (ref 0–0.2)
NT-PROBNP SERPL-MCNC: 151.5 PG/ML (ref 0–900)
OSMOLALITY UR: 564 MOSM/KG (ref 300–800)
PLATELET # BLD AUTO: 198 10*3/MM3 (ref 140–450)
PMV BLD AUTO: 8.3 FL (ref 6–12)
POLYCHROMASIA BLD QL SMEAR: NORMAL
POTASSIUM SERPL-SCNC: 5.3 MMOL/L (ref 3.5–5.2)
POTASSIUM SERPL-SCNC: 5.4 MMOL/L (ref 3.5–5.2)
PROT SERPL-MCNC: 6.7 G/DL (ref 6–8.5)
PROTHROMBIN TIME: 10.7 SECONDS (ref 9.6–11.7)
RBC # BLD AUTO: 5.75 10*6/MM3 (ref 4.14–5.8)
S PNEUM AG SPEC QL LA: NEGATIVE
SMALL PLATELETS BLD QL SMEAR: ADEQUATE
SODIUM SERPL-SCNC: 133 MMOL/L (ref 136–145)
SODIUM UR-SCNC: 69 MMOL/L
STRESS TARGET HR: 137 BPM
WBC MORPH BLD: NORMAL
WBC NRBC COR # BLD: 3.5 10*3/MM3 (ref 3.4–10.8)

## 2021-12-15 PROCEDURE — G0378 HOSPITAL OBSERVATION PER HR: HCPCS

## 2021-12-15 PROCEDURE — 83935 ASSAY OF URINE OSMOLALITY: CPT | Performed by: NURSE PRACTITIONER

## 2021-12-15 PROCEDURE — 93970 EXTREMITY STUDY: CPT

## 2021-12-15 PROCEDURE — 36415 COLL VENOUS BLD VENIPUNCTURE: CPT | Performed by: HOSPITALIST

## 2021-12-15 PROCEDURE — 70551 MRI BRAIN STEM W/O DYE: CPT

## 2021-12-15 PROCEDURE — 85610 PROTHROMBIN TIME: CPT | Performed by: NURSE PRACTITIONER

## 2021-12-15 PROCEDURE — 71275 CT ANGIOGRAPHY CHEST: CPT

## 2021-12-15 PROCEDURE — 87205 SMEAR GRAM STAIN: CPT | Performed by: NURSE PRACTITIONER

## 2021-12-15 PROCEDURE — 96372 THER/PROPH/DIAG INJ SC/IM: CPT

## 2021-12-15 PROCEDURE — 87899 AGENT NOS ASSAY W/OPTIC: CPT | Performed by: NURSE PRACTITIONER

## 2021-12-15 PROCEDURE — 96375 TX/PRO/DX INJ NEW DRUG ADDON: CPT

## 2021-12-15 PROCEDURE — 83735 ASSAY OF MAGNESIUM: CPT | Performed by: NURSE PRACTITIONER

## 2021-12-15 PROCEDURE — 25010000005 REMDESIVIR 100 MG/20ML SOLUTION

## 2021-12-15 PROCEDURE — 83880 ASSAY OF NATRIURETIC PEPTIDE: CPT | Performed by: NURSE PRACTITIONER

## 2021-12-15 PROCEDURE — 99233 SBSQ HOSP IP/OBS HIGH 50: CPT | Performed by: HOSPITALIST

## 2021-12-15 PROCEDURE — 85730 THROMBOPLASTIN TIME PARTIAL: CPT | Performed by: NURSE PRACTITIONER

## 2021-12-15 PROCEDURE — 85025 COMPLETE CBC W/AUTO DIFF WBC: CPT | Performed by: NURSE PRACTITIONER

## 2021-12-15 PROCEDURE — 0 IOPAMIDOL PER 1 ML: Performed by: EMERGENCY MEDICINE

## 2021-12-15 PROCEDURE — 87070 CULTURE OTHR SPECIMN AEROBIC: CPT | Performed by: NURSE PRACTITIONER

## 2021-12-15 PROCEDURE — 84132 ASSAY OF SERUM POTASSIUM: CPT | Performed by: HOSPITALIST

## 2021-12-15 PROCEDURE — 93005 ELECTROCARDIOGRAM TRACING: CPT | Performed by: HOSPITALIST

## 2021-12-15 PROCEDURE — 25010000002 CALCIUM GLUCONATE-NACL 1-0.675 GM/50ML-% SOLUTION: Performed by: HOSPITALIST

## 2021-12-15 PROCEDURE — 82728 ASSAY OF FERRITIN: CPT | Performed by: NURSE PRACTITIONER

## 2021-12-15 PROCEDURE — 85007 BL SMEAR W/DIFF WBC COUNT: CPT | Performed by: NURSE PRACTITIONER

## 2021-12-15 PROCEDURE — 96376 TX/PRO/DX INJ SAME DRUG ADON: CPT

## 2021-12-15 PROCEDURE — 83615 LACTATE (LD) (LDH) ENZYME: CPT | Performed by: NURSE PRACTITIONER

## 2021-12-15 PROCEDURE — 25010000002 DEXAMETHASONE PER 1 MG: Performed by: NURSE PRACTITIONER

## 2021-12-15 PROCEDURE — 25010000002 ENOXAPARIN PER 10 MG: Performed by: NURSE PRACTITIONER

## 2021-12-15 PROCEDURE — 84300 ASSAY OF URINE SODIUM: CPT | Performed by: NURSE PRACTITIONER

## 2021-12-15 PROCEDURE — 96365 THER/PROPH/DIAG IV INF INIT: CPT

## 2021-12-15 PROCEDURE — 80053 COMPREHEN METABOLIC PANEL: CPT | Performed by: NURSE PRACTITIONER

## 2021-12-15 PROCEDURE — 97161 PT EVAL LOW COMPLEX 20 MIN: CPT

## 2021-12-15 RX ORDER — KETOROLAC TROMETHAMINE 30 MG/ML
30 INJECTION, SOLUTION INTRAMUSCULAR; INTRAVENOUS EVERY 6 HOURS PRN
Status: DISCONTINUED | OUTPATIENT
Start: 2021-12-15 | End: 2021-12-16 | Stop reason: HOSPADM

## 2021-12-15 RX ORDER — CALCIUM GLUCONATE 20 MG/ML
1 INJECTION, SOLUTION INTRAVENOUS ONCE
Status: COMPLETED | OUTPATIENT
Start: 2021-12-15 | End: 2021-12-16

## 2021-12-15 RX ORDER — BENZONATATE 100 MG/1
100 CAPSULE ORAL 3 TIMES DAILY PRN
COMMUNITY

## 2021-12-15 RX ORDER — PANTOPRAZOLE SODIUM 40 MG/1
40 TABLET, DELAYED RELEASE ORAL DAILY
COMMUNITY

## 2021-12-15 RX ORDER — AMLODIPINE BESYLATE 5 MG/1
5 TABLET ORAL DAILY
COMMUNITY

## 2021-12-15 RX ADMIN — SODIUM CHLORIDE, PRESERVATIVE FREE 10 ML: 5 INJECTION INTRAVENOUS at 21:00

## 2021-12-15 RX ADMIN — ENOXAPARIN SODIUM 40 MG: 40 INJECTION SUBCUTANEOUS at 15:06

## 2021-12-15 RX ADMIN — CALCIUM GLUCONATE 1 G: 20 INJECTION, SOLUTION INTRAVENOUS at 18:16

## 2021-12-15 RX ADMIN — SODIUM CHLORIDE, PRESERVATIVE FREE 10 ML: 5 INJECTION INTRAVENOUS at 09:46

## 2021-12-15 RX ADMIN — DEXAMETHASONE SODIUM PHOSPHATE 6 MG: 4 INJECTION, SOLUTION INTRA-ARTICULAR; INTRALESIONAL; INTRAMUSCULAR; INTRAVENOUS; SOFT TISSUE at 09:45

## 2021-12-15 RX ADMIN — IOPAMIDOL 100 ML: 755 INJECTION, SOLUTION INTRAVENOUS at 00:28

## 2021-12-15 NOTE — PROGRESS NOTES
[facility] Medicine Services        Patient Name: Kentrell Cleaning  : 1960  MRN: [id number]  Primary Care Physician:  Provider, No Known  Date of admission: 2021        Subjective       Chief Complaint: weakness     History of Present Illness: Kentrell Cleaning is a 61 y.o. male with Past medical history of hypertension who presented to [facility] on 2021 complaining of some increasing general weakness, decreased appetite decreased smell and taste over the last 2 days after his wife had Covid last week.  Patient complains of generalized weakness, with the left arm and leg weaker than the right.  He complains of mid back pain for the past 3 days, describes as an immediate dull ache.  Patient rates back pain 9 on sliding scale 0-10.  Patient also complains of fatigue, difficulty relating, loss of appetite, and loss of taste and smell.  Patient denies chest pain, shortness of air, cough, nausea, fever, chills.     In the ED, CT head showed no acute intracranial abnormality; there is paranasal sinus opacification running involving the frontal, ethmoid and maxillary sinuses; brain MRI is more sensitive.  Chest x-ray showed elevation of the right hemidiaphragm with subtle peripheral hazy groundglass opacities and interstitial thickening in both lungs, likely atypical/viral pneumonia.  EKG showed sinus rhythm.  COVID-19 positive.  All labs unremarkable except sodium 131, calcium 8.3, AST 46.  All vital signs unremarkable.  Patient received Decadron, IV fluid bolus in the ED.  Patient admitted to hospitalist service for further evaluation and treatment.   Hospital course;  12/15/2021; patient denies any left upper or lower extremity weakness, no speech or swallow issues, hemodynamically stable, elevated D-dimer, will check venous Dopplers bilateral lower extremity, neurology has been consulted await MRI of the brain.  Review of Systems   Constitutional: Positive for decreased  appetite and malaise/fatigue. Negative for chills and fever.   HENT: Negative.    Eyes: Negative.    Cardiovascular: Negative.  Negative for chest pain.   Respiratory: Negative.  Negative for cough.    Endocrine: Negative.    Skin: Negative.    Musculoskeletal: Positive for back pain.   Gastrointestinal: Negative.  Negative for nausea.   Genitourinary: Negative.    Neurological: Positive for weakness.   Psychiatric/Behavioral: Negative.    Allergic/Immunologic: Negative.          Personal History      Medical History   No past medical history on file.        Surgical History   No past surgical history on file.        Family History: family history is not on file. Otherwise pertinent FHx was reviewed and not pertinent to current issue.     Social History:       Home Medications:           Prior to Admission Medications      Prescriptions Last Dose Informant Patient Reported? Taking?     metoprolol tartrate (LOPRESSOR) 25 MG tablet     No No     Take 1 tablet by mouth 2 (Two) Times a Day.                Allergies:  No Known Allergies     Objective       Vitals:   Temp:  [97.5 °F (36.4 °C)-98.3 °F (36.8 °C)] 97.8 °F (36.6 °C)  Heart Rate:  [78-90] 87  Resp:  [17-18] 17  BP: (124-151)/(82-96) 151/96  Flow (L/min):  [2] 2  Physical Exam  Vitals and nursing note reviewed.   Constitutional:       Appearance: Normal appearance.   HENT:      Head: Normocephalic.      Nose: Nose normal.      Mouth/Throat:      Pharynx: Oropharynx is clear.   Eyes:      Extraocular Movements: Extraocular movements intact.   Cardiovascular:      Rate and Rhythm: Normal rate and regular rhythm.      Pulses: Normal pulses.      Heart sounds: Normal heart sounds.   Pulmonary:      Effort: Pulmonary effort is normal.      Breath sounds: Normal breath sounds.   Abdominal:      General: Bowel sounds are normal.      Palpations: Abdomen is soft.   Musculoskeletal:         General: Normal range of motion.      Cervical back: Normal range of motion.   Bilateral upper and lower extremity strength normal  Skin:     General: Skin is warm and dry.   Neurological:      Mental Status: He is alert and oriented to person, place, and time.   Psychiatric:         Mood and Affect: Mood normal.         Behavior: Behavior normal.            Result Review    Result Review:  I have personally reviewed the results from the time of this admission to 12/14/2021 20:57 EST and agree with these findings:  [x]?  Laboratory  [x]?  Microbiology  [x]?  Radiology  [x]?  EKG/Telemetry   []?  Cardiology/Vascular   []?  Pathology  []?  Old records  []?  Other:  Most notable findings include: As above     Lab Results   Component Value Date    GLUCOSE 113 (H) 12/15/2021    CALCIUM 8.3 (L) 12/15/2021     (L) 12/15/2021    K 5.3 (H) 12/15/2021    CO2 23.0 12/15/2021    CL 98 12/15/2021    BUN 13 12/15/2021    CREATININE 0.66 (L) 12/15/2021    EGFRIFNONA 124 12/15/2021    BCR 19.7 12/15/2021    ANIONGAP 12.0 12/15/2021     Lab Results   Component Value Date    WBC 3.50 12/15/2021    HGB 19.0 (H) 12/15/2021    HCT 56.4 (H) 12/15/2021    MCV 98.0 (H) 12/15/2021     12/15/2021       Assessment/Plan          Active Hospital Problems:       Active Hospital Problems     Diagnosis     • **Pneumonia due to COVID-19 virus     • Hypocalcemia     • Hyponatremia     • Generalized weakness     • Primary hypertension        Plan:        Pneumonia due to COVID-19 virus with hypoxia  -COVID-19 positive  -Chest x-ray reviewed  -EKG reviewed  -AST 46  -Initial troponin negative  -Serial troponins ordered  -Lactic acid normal.  -Blood cultures x2 ordered  -Sputum culture ordered,  -Urine for Legionella and strep pneumo, both negative.  -Ferritin elevated,  procalcitonin normal, CRP elevated at 11.44  -PT/INR, PTT ordered  -BNP ordered  -Magnesium ordered  -Enhanced droplet/contact precautions  -Continuous cardiac monitoring  -Continuous pulse ox  -Decadron given in the ED, continue  -Albuterol inhaler,  Tessalon, Robitussin ordered  -Remdesivir started on 12/14/2021   Elevated D-dimer; CT angiogram of the chest, negative for PE severe emphysema, pulmonary fibrosis in the perihilar and basilar lungs, borderline ascending aortic aneurysm measuring 4 cm in diameter.,  Will check venous Dopplers ultrasound bilateral lower extremity  Generalized weakness/CVA versus TIA  -Patient reports left-sided weakness greater than right  -CT head no acute intracranial abnormality noted  -Fall precautions  -MRI brain ordered, pending, neurology consulted.     Primary hypertension  -BP well-controlled  -Lipid panel ordered     Hypocalcemia  -Calcium 8.3  -Ionized calcium 1.11, replace as per protocol.     Hyponatremia likely dilutional; will fluid restrict to 1500 mL per 24 hours  -Sodium 131--> 133  -Serum osmolality low, urine osmolality normal, urine sodium 69   Hyperkalemia mild; recheck  DVT prophylaxis:  Medical DVT prophylaxis orders are present.     CODE STATUS:    Code Status (Patient has no pulse and is not breathing): CPR (Attempt to Resuscitate)  Medical Interventions (Patient has pulse or is breathing): Full Support     Admission Status:  I believe this patient meets observation status.     I discussed the patient's findings and my recommendations with patient and family.     This patient has been examined wearing appropriate Personal Protective Equipment.    Electronically signed by Parth Light MD, 12/15/21, 2:58 PM EST.

## 2021-12-15 NOTE — PLAN OF CARE
Problem: Breathing Pattern Ineffective  Goal: Effective Breathing Pattern  Outcome: Ongoing, Progressing   Goal Outcome Evaluation:

## 2021-12-15 NOTE — THERAPY EVALUATION
Patient Name: Kentrell Cleaning  : 1960    MRN: [id number]                              Today's Date: 12/15/2021       Admit Date: 2021    Visit Dx:     ICD-10-CM ICD-9-CM   1. Pneumonia due to COVID-19 virus  U07.1 480.8    J12.82 079.89   2. Hypoxia  R09.02 799.02   3. Weakness  R53.1 780.79     Patient Active Problem List   Diagnosis   • Pneumonia due to COVID-19 virus   • Corneal ulcer of right eye   • Primary hypertension   • Onychomycosis   • Hypocalcemia   • Hyponatremia   • Generalized weakness     History reviewed. No pertinent past medical history.  History reviewed. No pertinent surgical history.   General Information     Row Name 12/15/21 1534          Physical Therapy Time and Intention    Document Type evaluation  -CR     Mode of Treatment physical therapy  -CR     Row Name 12/15/21 1534          General Information    Patient Profile Reviewed yes  -CR     Prior Level of Function independent:; community mobility; all household mobility; driving; work  -CR     Existing Precautions/Restrictions no known precautions/restrictions  -CR     Barriers to Rehab none identified  -CR     Row Name 12/15/21 1534          Living Environment    Lives With spouse  -CR     Row Name 12/15/21 1534          Home Main Entrance    Number of Stairs, Main Entrance one  -CR     Row Name 12/15/21 1534          Stairs Within Home, Primary    Number of Stairs, Within Home, Primary none  -CR     Row Name 12/15/21 1534          Cognition    Orientation Status (Cognition) oriented x 4  -CR     Row Name 12/15/21 1534          Safety Issues, Functional Mobility    Impairments Affecting Function (Mobility) shortness of breath; endurance/activity tolerance  -CR           User Key  (r) = Recorded By, (t) = Taken By, (c) = Cosigned By    Initials Name Provider Type    CR Reyes, Carmela, PT Physical Therapist               Mobility     Row Name 12/15/21 1535          Bed Mobility    Bed Mobility bed mobility (all) activities  -CR      All Activities, Elizabeth (Bed Mobility) modified independence  -CR     Row Name 12/15/21 1535          Bed-Chair Transfer    Bed-Chair Elizabeth (Transfers) standby assist  -CR     Row Name 12/15/21 1535          Sit-Stand Transfer    Sit-Stand Elizabeth (Transfers) modified independence  -CR           User Key  (r) = Recorded By, (t) = Taken By, (c) = Cosigned By    Initials Name Provider Type    CR Reyes, Carmela, THEA Physical Therapist               Obj/Interventions     Row Name 12/15/21 1535          Range of Motion Comprehensive    General Range of Motion no range of motion deficits identified  -CR     Row Name 12/15/21 1535          Strength Comprehensive (MMT)    General Manual Muscle Testing (MMT) Assessment no strength deficits identified  -CR     Row Name 12/15/21 1535          Balance    Balance Assessment sitting static balance; standing static balance; standing dynamic balance  -CR     Static Sitting Balance WNL; sitting, edge of bed  -CR     Static Standing Balance WFL  -CR     Dynamic Standing Balance mild impairment; supported  -CR     Row Name 12/15/21 1535          Sensory Assessment (Somatosensory)    Sensory Assessment (Somatosensory) sensation intact  -CR           User Key  (r) = Recorded By, (t) = Taken By, (c) = Cosigned By    Initials Name Provider Type    CR Reyes, Carmela, PT Physical Therapist               Goals/Plan     Row Name 12/15/21 1538          Gait Training Goal 1 (PT)    Activity/Assistive Device (Gait Training Goal 1, PT) gait (walking locomotion); increase energy conservation  -CR     Elizabeth Level (Gait Training Goal 1, PT) modified independence  -CR     Distance (Gait Training Goal 1, PT) 30 ft x 4  -CR     Time Frame (Gait Training Goal 1, PT) 1 week  -CR           User Key  (r) = Recorded By, (t) = Taken By, (c) = Cosigned By    Initials Name Provider Type    CR Reyes, Carmela, PT Physical Therapist               Clinical Impression     Row Name  12/15/21 1538          Pain    Additional Documentation Pain Scale: Numbers Pre/Post-Treatment (Group)  -CR     Row Name 12/15/21 1535          Pain Scale: Numbers Pre/Post-Treatment    Pretreatment Pain Rating 0/10 - no pain  -CR     Posttreatment Pain Rating 0/10 - no pain  -CR     Row Name 12/15/21 1536          Plan of Care Review    Plan of Care Reviewed With patient  -CR     Outcome Summary 60 y/o male came to hospital due to inc weakness, dec sense of smell and taste and found +COVID-19.PMH includes HTN. 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 Could mot ambulate but able to stand for 3 minutes unsupported without loss of balance however noted drop in spO2 to 87%. Will follow up for gait but should be safe to dc home.  -CR     Row Name 12/15/21 1537          Therapy Assessment/Plan (PT)    Patient/Family Therapy Goals Statement (PT) home  -CR     Rehab Potential (PT) good, to achieve stated therapy goals  -CR     Criteria for Skilled Interventions Met (PT) yes; skilled treatment is necessary  -CR     Predicted Duration of Therapy Intervention (PT) dc  -CR           User Key  (r) = Recorded By, (t) = Taken By, (c) = Cosigned By    Initials Name Provider Type    CR Reyes, Carmela, PT Physical Therapist               Outcome Measures     Row Name 12/15/21 1534          How much help from another person do you currently need...    Turning from your back to your side while in flat bed without using bedrails? 4  -CR     Moving from lying on back to sitting on the side of a flat bed without bedrails? 4  -CR     Moving to and from a bed to a chair (including a wheelchair)? 4  -CR     Standing up from a chair using your arms (e.g., wheelchair, bedside chair)? 4  -CR     Climbing 3-5 steps with a railing? 4  -CR     To walk in hospital room? 4  -CR     AM-PAC 6 Clicks Score (PT) 24  -CR     Row Name 12/15/21 6977          Functional Assessment     Outcome Measure Options AM-PAC 6 Clicks Basic Mobility (PT)  -CR           User Key  (r) = Recorded By, (t) = Taken By, (c) = Cosigned By    Initials Name Provider Type    CR Reyes, Carmela, PT Physical Therapist                             Physical Therapy Education                 Title: PT OT SLP Therapies (Done)     Topic: Physical Therapy (Done)     Point: Mobility training (Done)     Learning Progress Summary           Patient Acceptance, E, VU by CR at 12/15/2021 1538                               User Key     Initials Effective Dates Name Provider Type Discipline    CR 06/16/21 -  Reyes, Carmela, PT Physical Therapist PT              PT Recommendation and Plan     Plan of Care Reviewed With: patient  Outcome Summary: 60 y/o male came to hospital due to inc weakness, dec sense of smell and taste and found +COVID-19.PMH includes HTN. 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 Could mot ambulate but able to stand for 3 minutes unsupported without loss of balance however noted drop in spO2 to 87%. Will follow up for gait but should be safe to dc home.     Time Calculation:    PT Charges     Row Name 12/15/21 1539             Time Calculation    Start Time 1342  -CR      Stop Time 1400  -CR      Time Calculation (min) 18 min  -CR      PT Received On 12/15/21  -CR      PT - Next Appointment 12/16/21  -CR      PT Goal Re-Cert Due Date 12/29/21  -CR              Time Calculation- PT    Total Timed Code Minutes- PT 0 minute(s)  -CR            User Key  (r) = Recorded By, (t) = Taken By, (c) = Cosigned By    Initials Name Provider Type    CR Reyes, Carmela, PT Physical Therapist              Therapy Charges for Today     Code Description Service Date Service Provider Modifiers Qty    80329310683 HC PT EVAL LOW COMPLEXITY 3 12/15/2021 Reyes, Carmela, PT GP 1          PT G-Codes  Outcome Measure Options: AM-PAC 6 Clicks Basic Mobility (PT)  AM-PAC 6  Clicks Score (PT): 24    Carmela Reyes, PT  12/15/2021

## 2021-12-15 NOTE — H&P
[facility] Medicine Services      Patient Name: Kentrell Cleaning  : 1960  MRN: [id number]  Primary Care Physician:  Provider, No Known  Date of admission: 2021      Subjective      Chief Complaint: weakness    History of Present Illness: Kentrell Cleaning is a 61 y.o. male with Past medical history of hypertension who presented to [facility] on 2021 complaining of some increasing general weakness, decreased appetite decreased smell and taste over the last 2 days after his wife had Covid last week.  Patient complains of generalized weakness, with the left arm and leg weaker than the right.  He complains of mid back pain for the past 3 days, describes as an immediate dull ache.  Patient rates back pain 9 on sliding scale 0-10.  Patient also complains of fatigue, difficulty relating, loss of appetite, and loss of taste and smell.  Patient denies chest pain, shortness of air, cough, nausea, fever, chills.    In the ED, CT head showed no acute intracranial abnormality; there is paranasal sinus opacification running involving the frontal, ethmoid and maxillary sinuses; brain MRI is more sensitive.  Chest x-ray showed elevation of the right hemidiaphragm with subtle peripheral hazy groundglass opacities and interstitial thickening in both lungs, likely atypical/viral pneumonia.  EKG showed sinus rhythm.  COVID-19 positive.  All labs unremarkable except sodium 131, calcium 8.3, AST 46.  All vital signs unremarkable.  Patient received Decadron, IV fluid bolus in the ED.  Patient admitted to hospitalist service for further evaluation and treatment.    Review of Systems   Constitutional: Positive for decreased appetite and malaise/fatigue. Negative for chills and fever.   HENT: Negative.    Eyes: Negative.    Cardiovascular: Negative.  Negative for chest pain.   Respiratory: Negative.  Negative for cough.    Endocrine: Negative.    Skin: Negative.    Musculoskeletal: Positive for  back pain.   Gastrointestinal: Negative.  Negative for nausea.   Genitourinary: Negative.    Neurological: Positive for weakness.   Psychiatric/Behavioral: Negative.    Allergic/Immunologic: Negative.        Personal History     No past medical history on file.    No past surgical history on file.    Family History: family history is not on file. Otherwise pertinent FHx was reviewed and not pertinent to current issue.    Social History:      Home Medications:  Prior to Admission Medications     Prescriptions Last Dose Informant Patient Reported? Taking?    metoprolol tartrate (LOPRESSOR) 25 MG tablet   No No    Take 1 tablet by mouth 2 (Two) Times a Day.            Allergies:  No Known Allergies    Objective      Vitals:   Temp:  [98.9 °F (37.2 °C)] 98.9 °F (37.2 °C)  Heart Rate:  [82-90] 82  Resp:  [18-19] 18  BP: (115-124)/(82-86) 124/82  Flow (L/min):  [2] 2    Physical Exam  Vitals and nursing note reviewed.   Constitutional:       Appearance: Normal appearance.   HENT:      Head: Normocephalic.      Nose: Nose normal.      Mouth/Throat:      Pharynx: Oropharynx is clear.   Eyes:      Extraocular Movements: Extraocular movements intact.   Cardiovascular:      Rate and Rhythm: Normal rate and regular rhythm.      Pulses: Normal pulses.      Heart sounds: Normal heart sounds.   Pulmonary:      Effort: Pulmonary effort is normal.      Breath sounds: Normal breath sounds.   Abdominal:      General: Bowel sounds are normal.      Palpations: Abdomen is soft.   Musculoskeletal:         General: Normal range of motion.      Cervical back: Normal range of motion.   Skin:     General: Skin is warm and dry.   Neurological:      Mental Status: He is alert and oriented to person, place, and time.   Psychiatric:         Mood and Affect: Mood normal.         Behavior: Behavior normal.          Result Review    Result Review:  I have personally reviewed the results from the time of this admission to 12/14/2021 20:57 EST and  agree with these findings:  [x]  Laboratory  [x]  Microbiology  [x]  Radiology  [x]  EKG/Telemetry   []  Cardiology/Vascular   []  Pathology  []  Old records  []  Other:  Most notable findings include: As above    Assessment/Plan        Active Hospital Problems:  Active Hospital Problems    Diagnosis    • **Pneumonia due to COVID-19 virus    • Hypocalcemia    • Hyponatremia    • Generalized weakness    • Primary hypertension      Plan:    -----Home medication reconciliation not completed.  Will complete home med rec once nursing reviews.-----    Pneumonia due to COVID-19 virus  -COVID-19 positive  -Chest x-ray reviewed  -EKG reviewed  -AST 46  -Initial troponin negative  -Serial troponins ordered  -Lactic ordered  -Blood cultures x2 ordered  -Sputum culture ordered  -Urine for Legionella and strep pneumo ordered  -Ferritin, D-dimer, procalcitonin, CRP ordered  -PT/INR, PTT ordered  -BNP ordered  -Magnesium ordered  -Enhanced droplet/contact precautions  -Continuous cardiac monitoring  -Continuous pulse ox  -Decadron given in the ED, continue  -Albuterol inhaler, Tessalon, Robitussin ordered  -Remdesivir ordered    Generalized weakness  -Patient reports left-sided weakness greater than right  -CT head reviewed  -Fall precautions  -MRI brain ordered    Primary hypertension  -BP well-controlled  -Lipid panel ordered    Hypocalcemia  -Calcium 8.3  -Ionized calcium ordered    Hyponatremia  -Sodium 131  -Serum osmolality, urine osmolality, urine sodium ordered    DVT prophylaxis:  Medical DVT prophylaxis orders are present.    CODE STATUS:    Code Status (Patient has no pulse and is not breathing): CPR (Attempt to Resuscitate)  Medical Interventions (Patient has pulse or is breathing): Full Support    Admission Status:  I believe this patient meets observation status.    I discussed the patient's findings and my recommendations with patient and family.    This patient has been examined wearing appropriate Personal  Protective Equipment. 12/14/21      Signature: Electronically signed by HEIDY Erickson, 12/14/21, 11:56 PM EST.

## 2021-12-15 NOTE — CASE MANAGEMENT/SOCIAL WORK
Discharge Planning Assessment   Edy     Patient Name: Kentrell Cleaning  MRN: [id number]  Today's Date: 12/15/2021    Admit Date: 12/14/2021     Discharge Needs Assessment     Row Name 12/15/21 1331       Living Environment    Lives With spouse    Name(s) of Who Lives With Patient Dixie    Current Living Arrangements home/apartment/condo    Primary Care Provided by self    Provides Primary Care For no one    Family Caregiver if Needed spouse    Family Caregiver Names Dixie    Quality of Family Relationships supportive    Able to Return to Prior Arrangements yes       Resource/Environmental Concerns    Resource/Environmental Concerns none       Transition Planning    Patient/Family Anticipates Transition to home with family    Patient/Family Anticipated Services at Transition none    Transportation Anticipated family or friend will provide  Spouse       Discharge Needs Assessment    Concerns to be Addressed denies needs/concerns at this time    Equipment Needed After Discharge oxygen  [place] for O2 needs    Provided Post Acute Provider List? Yes    Post Acute Provider List DME Supplier    Delivered To Patient    Method of Delivery In person               Discharge Plan     Row Name 12/15/21 8740       Plan    Plan Follow for O2 needs    Plan Comments Spoke with patient at bedside. He currently denies dc needs,but is requiring oxygen. May need O2 at dc. Pharmacy confirmed. Pt is listed as having no PCP in EPIC, but states his PCP is Dr. Aguirre at [facility]. Voicemail left for ER Registration asking them to add Dr. Aguirre as patient's PCP              Continued Care and Services - Admitted Since 12/14/2021    Coordination has not been started for this encounter.          Demographic Summary     Row Name 12/15/21 0495       General Information    Admission Type observation    Referral Source admission list    Preferred Language English       Contact Information    Permission Granted to Share Info With case  manager               Functional Status     Row Name 12/15/21 1330       Functional Status    Usual Activity Tolerance good    Current Activity Tolerance moderate       Functional Status, IADL    Medications independent    Meal Preparation independent    Housekeeping independent    Laundry independent    Shopping independent                Ivone Masters RN, [facility]  Office: [phone]  Fax: [phone]  [email or web address]      I met with patient in room wearing PPE: mask and goggles.     Maintained distance greater than six feet and spent </=15 minutes in the room      Ivone Masters RN

## 2021-12-15 NOTE — PLAN OF CARE
Goal Outcome Evaluation:  Plan of Care Reviewed With: patient           Outcome Summary: 60 y/o male came to hospital due to inc weakness, dec sense of smell and taste and found +COVID-19.PMH includes HTN. 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 Could mot ambulate but able to stand for 3 minutes unsupported without loss of balance however noted drop in spO2 to 87%. Will follow up for gait but should be safe to dc home.

## 2021-12-16 ENCOUNTER — READMISSION MANAGEMENT (OUTPATIENT)
Dept: CALL CENTER | Facility: HOSPITAL | Age: 61
End: 2021-12-16

## 2021-12-16 VITALS
HEART RATE: 73 BPM | BODY MASS INDEX: 25.73 KG/M2 | RESPIRATION RATE: 14 BRPM | OXYGEN SATURATION: 92 % | HEIGHT: 72 IN | SYSTOLIC BLOOD PRESSURE: 148 MMHG | TEMPERATURE: 97.6 F | WEIGHT: 190 LBS | DIASTOLIC BLOOD PRESSURE: 89 MMHG

## 2021-12-16 PROBLEM — E87.1 HYPONATREMIA: Status: RESOLVED | Noted: 2021-12-14 | Resolved: 2021-12-16

## 2021-12-16 PROBLEM — R53.1 GENERALIZED WEAKNESS: Status: RESOLVED | Noted: 2021-12-14 | Resolved: 2021-12-16

## 2021-12-16 LAB
ALBUMIN SERPL-MCNC: 2.8 G/DL (ref 3.5–5.2)
ALBUMIN/GLOB SERPL: 0.9 G/DL
ALP SERPL-CCNC: 92 U/L (ref 39–117)
ALT SERPL W P-5'-P-CCNC: 48 U/L (ref 1–41)
ANION GAP SERPL CALCULATED.3IONS-SCNC: 11 MMOL/L (ref 5–15)
AST SERPL-CCNC: 49 U/L (ref 1–40)
BASOPHILS # BLD AUTO: 0 10*3/MM3 (ref 0–0.2)
BASOPHILS NFR BLD AUTO: 0.1 % (ref 0–1.5)
BILIRUB SERPL-MCNC: 0.5 MG/DL (ref 0–1.2)
BUN SERPL-MCNC: 16 MG/DL (ref 6–20)
BUN/CREAT SERPL: 21.3 (ref 7–25)
CALCIUM SPEC-SCNC: 8.1 MG/DL (ref 8.6–10.5)
CHLORIDE SERPL-SCNC: 100 MMOL/L (ref 98–107)
CO2 SERPL-SCNC: 24 MMOL/L (ref 22–29)
CREAT SERPL-MCNC: 0.75 MG/DL (ref 0.76–1.27)
DEPRECATED RDW RBC AUTO: 52.1 FL (ref 37–54)
EOSINOPHIL # BLD AUTO: 0 10*3/MM3 (ref 0–0.4)
EOSINOPHIL NFR BLD AUTO: 0 % (ref 0.3–6.2)
ERYTHROCYTE [DISTWIDTH] IN BLOOD BY AUTOMATED COUNT: 15.5 % (ref 12.3–15.4)
FERRITIN SERPL-MCNC: 758.1 NG/ML (ref 30–400)
GFR SERPL CREATININE-BSD FRML MDRD: 107 ML/MIN/1.73
GLOBULIN UR ELPH-MCNC: 3.1 GM/DL
GLUCOSE SERPL-MCNC: 99 MG/DL (ref 65–99)
HCT VFR BLD AUTO: 51.2 % (ref 37.5–51)
HGB BLD-MCNC: 17.5 G/DL (ref 13–17.7)
LYMPHOCYTES # BLD AUTO: 0.7 10*3/MM3 (ref 0.7–3.1)
LYMPHOCYTES NFR BLD AUTO: 14.8 % (ref 19.6–45.3)
MAGNESIUM SERPL-MCNC: 1.9 MG/DL (ref 1.6–2.6)
MCH RBC QN AUTO: 33.4 PG (ref 26.6–33)
MCHC RBC AUTO-ENTMCNC: 34.2 G/DL (ref 31.5–35.7)
MCV RBC AUTO: 97.8 FL (ref 79–97)
MONOCYTES # BLD AUTO: 0.8 10*3/MM3 (ref 0.1–0.9)
MONOCYTES NFR BLD AUTO: 15.8 % (ref 5–12)
NEUTROPHILS NFR BLD AUTO: 3.4 10*3/MM3 (ref 1.7–7)
NEUTROPHILS NFR BLD AUTO: 69.3 % (ref 42.7–76)
NRBC BLD AUTO-RTO: 0.2 /100 WBC (ref 0–0.2)
PLATELET # BLD AUTO: 237 10*3/MM3 (ref 140–450)
PMV BLD AUTO: 7.5 FL (ref 6–12)
POTASSIUM SERPL-SCNC: 4.4 MMOL/L (ref 3.5–5.2)
PROT SERPL-MCNC: 5.9 G/DL (ref 6–8.5)
RBC # BLD AUTO: 5.24 10*6/MM3 (ref 4.14–5.8)
SODIUM SERPL-SCNC: 135 MMOL/L (ref 136–145)
WBC NRBC COR # BLD: 4.9 10*3/MM3 (ref 3.4–10.8)

## 2021-12-16 PROCEDURE — 96376 TX/PRO/DX INJ SAME DRUG ADON: CPT

## 2021-12-16 PROCEDURE — 25010000002 ENOXAPARIN PER 10 MG: Performed by: NURSE PRACTITIONER

## 2021-12-16 PROCEDURE — 94618 PULMONARY STRESS TESTING: CPT

## 2021-12-16 PROCEDURE — 99222 1ST HOSP IP/OBS MODERATE 55: CPT | Performed by: PSYCHIATRY & NEUROLOGY

## 2021-12-16 PROCEDURE — 96372 THER/PROPH/DIAG INJ SC/IM: CPT

## 2021-12-16 PROCEDURE — 97116 GAIT TRAINING THERAPY: CPT

## 2021-12-16 PROCEDURE — 97110 THERAPEUTIC EXERCISES: CPT

## 2021-12-16 PROCEDURE — 85025 COMPLETE CBC W/AUTO DIFF WBC: CPT | Performed by: NURSE PRACTITIONER

## 2021-12-16 PROCEDURE — 99239 HOSP IP/OBS DSCHRG MGMT >30: CPT | Performed by: HOSPITALIST

## 2021-12-16 PROCEDURE — 82085 ASSAY OF ALDOLASE: CPT | Performed by: PSYCHIATRY & NEUROLOGY

## 2021-12-16 PROCEDURE — 83735 ASSAY OF MAGNESIUM: CPT | Performed by: NURSE PRACTITIONER

## 2021-12-16 PROCEDURE — 80053 COMPREHEN METABOLIC PANEL: CPT | Performed by: NURSE PRACTITIONER

## 2021-12-16 PROCEDURE — 82728 ASSAY OF FERRITIN: CPT | Performed by: NURSE PRACTITIONER

## 2021-12-16 PROCEDURE — 25010000002 DEXAMETHASONE PER 1 MG: Performed by: NURSE PRACTITIONER

## 2021-12-16 RX ORDER — AMLODIPINE BESYLATE 5 MG/1
5 TABLET ORAL DAILY
Status: DISCONTINUED | OUTPATIENT
Start: 2021-12-16 | End: 2021-12-16 | Stop reason: HOSPADM

## 2021-12-16 RX ORDER — PANTOPRAZOLE SODIUM 40 MG/1
40 TABLET, DELAYED RELEASE ORAL DAILY
Status: CANCELLED | OUTPATIENT
Start: 2021-12-16

## 2021-12-16 RX ORDER — BENZONATATE 100 MG/1
100 CAPSULE ORAL 3 TIMES DAILY PRN
Status: DISCONTINUED | OUTPATIENT
Start: 2021-12-16 | End: 2021-12-16

## 2021-12-16 RX ORDER — AMLODIPINE BESYLATE 5 MG/1
5 TABLET ORAL DAILY
Status: CANCELLED | OUTPATIENT
Start: 2021-12-16

## 2021-12-16 RX ORDER — DEXAMETHASONE 6 MG/1
6 TABLET ORAL DAILY
Qty: 8 TABLET | Refills: 0 | Status: SHIPPED | OUTPATIENT
Start: 2021-12-17 | End: 2021-12-25

## 2021-12-16 RX ORDER — PANTOPRAZOLE SODIUM 40 MG/1
40 TABLET, DELAYED RELEASE ORAL DAILY
Status: DISCONTINUED | OUTPATIENT
Start: 2021-12-16 | End: 2021-12-16 | Stop reason: HOSPADM

## 2021-12-16 RX ADMIN — REMDESIVIR 100 MG: 100 INJECTION, POWDER, LYOPHILIZED, FOR SOLUTION INTRAVENOUS at 01:50

## 2021-12-16 RX ADMIN — ENOXAPARIN SODIUM 40 MG: 40 INJECTION SUBCUTANEOUS at 15:44

## 2021-12-16 RX ADMIN — AMLODIPINE BESYLATE 5 MG: 5 TABLET ORAL at 15:44

## 2021-12-16 RX ADMIN — PANTOPRAZOLE SODIUM 40 MG: 40 TABLET, DELAYED RELEASE ORAL at 15:44

## 2021-12-16 RX ADMIN — DEXAMETHASONE SODIUM PHOSPHATE 6 MG: 4 INJECTION, SOLUTION INTRA-ARTICULAR; INTRALESIONAL; INTRAMUSCULAR; INTRAVENOUS; SOFT TISSUE at 08:56

## 2021-12-16 RX ADMIN — SODIUM CHLORIDE, PRESERVATIVE FREE 10 ML: 5 INJECTION INTRAVENOUS at 08:57

## 2021-12-16 NOTE — DISCHARGE PLACEMENT REQUEST
"Kentrell Cleaning (61 y.o. Male)             Date of Birth Social Security Number Address Home Phone MRN    1960  [facility] [place] [address] [phone] [id number]    Judaism Marital Status             None        Admission Date Admission Type Admitting Provider Attending Provider Department, Room/Bed    12/14/21 Emergency Parth Light MD Prakash, Shimoga, MD [facility] EMERGENCY DEPARTMENT, EDH4/4    Discharge Date Discharge Disposition Discharge Destination           Home or Self Care              Attending Provider: Parth Light MD    Allergies: No Known Allergies    Isolation: Enh Drop/Con   Infection: COVID (confirmed) (12/14/21)   Code Status: CPR   Advance Care Planning Activity    Ht: 182.9 cm (72\")   Wt: 86.2 kg (190 lb)    Admission Cmt: None   Principal Problem: Pneumonia due to COVID-19 virus [U07.1,J12.82]                 Active Insurance as of 12/14/2021     Primary Coverage     Payor Plan Insurance Group Employer/Plan Group    [facility] [employer] [facility] PPO [id number]     Payor Plan Address Payor Plan Phone Number Payor Plan Fax Number Effective Dates    [address] [phone]  1/1/2021 - None Entered    [facility] [id number]       Subscriber Name Subscriber Birth Date Member ID       KENTRELL CLEANING 1960 [id number]                 Emergency Contacts      (Rel.) Home Phone Work Phone Mobile Phone    LEWIS NAPOLES (Significant Other) -- -- [phone]              "

## 2021-12-16 NOTE — PROGRESS NOTES
Exercise Oximetry    Patient Name:Kentrell Cleaning   MRN: [id number]   Date: 12/16/21             ROOM AIR BASELINE   SpO2% 91   Heart Rate 82   Blood Pressure NA     EXERCISE ON ROOM AIR SpO2% EXERCISE ON O2 @ 2 LPM SpO2%   1 MINUTE 87% put on 1LPM 1 MINUTE    2 MINUTES  2 MINUTES 93%   3 MINUTES  3 MINUTES 93%   4 MINUTES  4 MINUTES 87% Increased to 2Lpm   5 MINUTES  5 MINUTES 92%   6 MINUTES  6 MINUTES 94%              Distance Walked   Distance Walked Around room for 6 minutes   Dyspnea (Braulio Scale)   Dyspnea (Braulio Scale) NA   Fatigue (Braulio Scale)   Fatigue (Braulio Scale) NA   SpO2% Post Exercise  SpO2% Post Exercise 91%   HR Post Exercise   HR Post Exercise 110   Time to Recovery   Time to Recovery 3 minutes     Comments: Patient was a little unsteady on his feet, but otherwise tolerate the walk well. No complaints of shortness of breath or weakness. Will required 2 liters of oxygen.

## 2021-12-16 NOTE — CONSULTS
Primary Care Provider: Provider, No Known     Consult requested by:  KURTIS Light MD    Reason for Consultation: Neurological evaluation for stroke and left sided weakness.     Kentrell Celaning is a 61 y.o. male *    History taken from: patient chart RN    Chief complaint: leg weakness.        SUBJECTIVE:    History of present illness:  The patient is a 61 year old gentleman with history of hypertension who presented to [facility] on 12/14/21 secondary to increase general weakness, decrease in appetite and decreased smell for last 2 days.  His wife was diagnosed with COVID -19 recently.  He also complained of left arm and leg weakness.  He was unable to walk and called EMS to come to hospital.  He also had dull ache in the back.  The patient was positive for Covid disease.  He also had a MRI of Brain which was unremarkable for acute stroke.  On evaluation his weakness has improved, he is able to raise his legs while sitting.  He denies vision problems, speech and swallowing problems, bowel and bladder problems.     Review of Systems   Constitutional: Negative  HENT: Negative.    Eyes: Negative.    Respiratory: Negative.    Cardiovascular: Negative.    Gastrointestinal: Negative.    Genitourinary: Negative.    Musculoskeletal: weakness  Skin: Negative.    Neurological: Negative.    Hematological: Negative.    Psychiatric/Behavioral: Negative.        PATIENT HISTORY:  History reviewed. No pertinent past medical history., History reviewed. No pertinent surgical history.,   Family History   Problem Relation Age of Onset   • Hypertension Mother    • Heart disease Mother    • Hypertension Father    • Heart disease Father    ,   Social History     Tobacco Use   • Smoking status: Current Every Day Smoker   • Smokeless tobacco: Never Used   • Tobacco comment: E-cigarettes   Substance Use Topics   • Alcohol use: Yes     Alcohol/week: 2.0 standard drinks     Types: 2 Shots of liquor per week   • Drug use: Yes     Types: Marijuana   ,  (Not in a hospital admission)  , Scheduled Meds:  dexamethasone, 6 mg, Oral, Daily   Or  dexamethasone, 6 mg, Intravenous, Daily  enoxaparin, 40 mg, Subcutaneous, Daily  remdesivir, 100 mg, Intravenous, Q24H  sodium chloride, 10 mL, Intravenous, Q12H    , Continuous Infusions:  Pharmacy Consult - Remdesivir,     , PRN Meds:  •  acetaminophen **OR** acetaminophen **OR** acetaminophen  •  albuterol sulfate HFA  •  artificial tears  •  benzonatate  •  guaiFENesin-dextromethorphan  •  ketorolac  •  magnesium sulfate **OR** magnesium sulfate in D5W 1g/100mL (PREMIX)  •  nitroglycerin  •  ondansetron **OR** ondansetron  •  Pharmacy Consult - Remdesivir  •  potassium chloride  •  potassium chloride  •  [COMPLETED] Insert peripheral IV **AND** sodium chloride  •  sodium chloride, Allergies:  Patient has no known allergies.    ________________________________________________________        OBJECTIVE:  Upon today's exam, The patient is sitting in bed in no apparent distress.  Head NC, AT, Neck supple, good pulmonary effort. CV  S1-S2 no murmur.  Abdomen soft, non tender.  Ext no edema.          Neurologic Exam  The patient is awake, alert, oriented x 3, speech is fluent with good comprehension, follow commands.  CN EOMI, no facial droop. Tongue midline. Motor upper extremities 5-/5.  Hip flexors 3+/5.  Hip add/abd 4/5.  Reflexes absent, plantar mute.  Sensory light touch intact.  Cerebellum finger to nose intact.   ________________________________________________________   RESULTS REVIEW:    VITAL SIGNS:   Temp:  [97.2 °F (36.2 °C)-97.8 °F (36.6 °C)] 97.2 °F (36.2 °C)  Heart Rate:  [62-87] 73  Resp:  [14-18] 14  BP: (151)/(96) 151/96     LABS:  WBC   Date Value Ref Range Status   12/16/2021 4.90 3.40 - 10.80 10*3/mm3 Final     RBC   Date Value Ref Range Status   12/16/2021 5.24 4.14 - 5.80 10*6/mm3 Final     Hemoglobin   Date Value Ref Range Status   12/16/2021 17.5 13.0 - 17.7 g/dL Final     Hematocrit   Date Value Ref Range  Status   12/16/2021 51.2 (H) 37.5 - 51.0 % Final     MCV   Date Value Ref Range Status   12/16/2021 97.8 (H) 79.0 - 97.0 fL Final     MCH   Date Value Ref Range Status   12/16/2021 33.4 (H) 26.6 - 33.0 pg Final     MCHC   Date Value Ref Range Status   12/16/2021 34.2 31.5 - 35.7 g/dL Final     RDW   Date Value Ref Range Status   12/16/2021 15.5 (H) 12.3 - 15.4 % Final     RDW-SD   Date Value Ref Range Status   12/16/2021 52.1 37.0 - 54.0 fl Final     MPV   Date Value Ref Range Status   12/16/2021 7.5 6.0 - 12.0 fL Final     Platelets   Date Value Ref Range Status   12/16/2021 237 140 - 450 10*3/mm3 Final     Neutrophil %   Date Value Ref Range Status   12/16/2021 69.3 42.7 - 76.0 % Final     Lymphocyte %   Date Value Ref Range Status   12/16/2021 14.8 (L) 19.6 - 45.3 % Final     Monocyte %   Date Value Ref Range Status   12/16/2021 15.8 (H) 5.0 - 12.0 % Final     Eosinophil %   Date Value Ref Range Status   12/16/2021 0.0 (L) 0.3 - 6.2 % Final     Basophil %   Date Value Ref Range Status   12/16/2021 0.1 0.0 - 1.5 % Final     Neutrophils, Absolute   Date Value Ref Range Status   12/16/2021 3.40 1.70 - 7.00 10*3/mm3 Final     Lymphocytes, Absolute   Date Value Ref Range Status   12/16/2021 0.70 0.70 - 3.10 10*3/mm3 Final     Monocytes, Absolute   Date Value Ref Range Status   12/16/2021 0.80 0.10 - 0.90 10*3/mm3 Final     Eosinophils, Absolute   Date Value Ref Range Status   12/16/2021 0.00 0.00 - 0.40 10*3/mm3 Final     Basophils, Absolute   Date Value Ref Range Status   12/16/2021 0.00 0.00 - 0.20 10*3/mm3 Final     nRBC   Date Value Ref Range Status   12/16/2021 0.2 0.0 - 0.2 /100 WBC Final     Glucose   Date Value Ref Range Status   12/16/2021 99 65 - 99 mg/dL Final     BUN   Date Value Ref Range Status   12/16/2021 16 6 - 20 mg/dL Final     Creatinine   Date Value Ref Range Status   12/16/2021 0.75 (L) 0.76 - 1.27 mg/dL Final     Sodium   Date Value Ref Range Status   12/16/2021 135 (L) 136 - 145 mmol/L Final      Potassium   Date Value Ref Range Status   12/16/2021 4.4 3.5 - 5.2 mmol/L Final     Chloride   Date Value Ref Range Status   12/16/2021 100 98 - 107 mmol/L Final     CO2   Date Value Ref Range Status   12/16/2021 24.0 22.0 - 29.0 mmol/L Final     Calcium   Date Value Ref Range Status   12/16/2021 8.1 (L) 8.6 - 10.5 mg/dL Final     Total Protein   Date Value Ref Range Status   12/16/2021 5.9 (L) 6.0 - 8.5 g/dL Final     Albumin   Date Value Ref Range Status   12/16/2021 2.80 (L) 3.50 - 5.20 g/dL Final     ALT (SGPT)   Date Value Ref Range Status   12/16/2021 48 (H) 1 - 41 U/L Final     AST (SGOT)   Date Value Ref Range Status   12/16/2021 49 (H) 1 - 40 U/L Final     Alkaline Phosphatase   Date Value Ref Range Status   12/16/2021 92 39 - 117 U/L Final     Total Bilirubin   Date Value Ref Range Status   12/16/2021 0.5 0.0 - 1.2 mg/dL Final     eGFR Non  Amer   Date Value Ref Range Status   12/16/2021 107 >60 mL/min/1.73 Final     BUN/Creatinine Ratio   Date Value Ref Range Status   12/16/2021 21.3 7.0 - 25.0 Final     Anion Gap   Date Value Ref Range Status   12/16/2021 11.0 5.0 - 15.0 mmol/L Final       Lab Results   Component Value Date    LDL 44 12/14/2021         IMAGING STUDIES:  CT Head Without Contrast    Result Date: 12/14/2021  No acute intracranial abnormality. There is paranasal sinus opacification running involving the frontal, ethmoid and maxillary sinuses. Brain MRI is more sensitive to evaluate for acute or subacute infarcts and to evaluate for intracranial metastatic disease.  Electronically Signed By-Cheyenne Andersen MD On:12/14/2021 6:06 PM This report was finalized on [id number] by  Cheyenne Andersen MD.    MRI Brain Without Contrast    Result Date: 12/15/2021  No restricted diffusion to suggest acute or subacute infarct. No intracranial hemorrhage. Pansinusitis.  Electronically Signed By-Cheyenne Andersen MD On:12/15/2021 5:22 PM This report was finalized on [id number] by  Cheyenne Andersen MD.    XR  Chest 1 View    Result Date: 12/14/2021  Elevation of the right hemidiaphragm with subtle peripheral hazy groundglass opacities and interstitial thickening in both lungs, likely atypical/viral pneumonia.  Electronically Signed By-Keenan Palma MD On:12/14/2021 5:07 PM This report was finalized on [id number] by  Keenan Palma MD.    CT Chest Pulmonary Embolism    Result Date: 12/15/2021  1. No evidence of pulmonary embolism. No evidence of an acute intrathoracic process. 2. Severe emphysema. 3. Pulmonary fibrosis in the perihilar and basilar lungs. 4. Borderline ascending aortic aneurysm measuring 4 cm in diameter. 5. Cardiomegaly and atherosclerosis. Electronically signed by:  Geronimo Sung M.D.  12/14/2021 10:47 PM      I reviewed the patient's new clinical results.      ________________________________________________________     PROBLEM LIST:    Pneumonia due to COVID-19 virus    Primary hypertension    Hypocalcemia    Hyponatremia    Generalized weakness          Assessment/Plan   ASSESSMENT/PLAN:  The patient is a 61 year old gentleman with history of hypertension who had developed covid 19 disease and its infection was detected.  He has been having generalized weakness, mostly in lower extremities especially proximal muscles of hip girdle.  The MRI of Brain is unremarkable for acute stroke.  He has clinical features suggestive of myopathy that may be related to COVID 19 disease.   Rec:  Will obtain, CPK and Aldolase.  Will follow.     Modification of stroke risk factors:   - Blood pressure should be less than 130/80 outpatient, HbA1c less than 6.5, LDL less than 70; b12>500 and smoking cessation if applicable. We would be grateful if the primary team / primary care physician would keep a close watch on the above targets.  - Stroke education  - Follow up with neurologist of choice      I discussed the patient's findings and my recommendations with patient and nursing staff    Mary Jauregui,  MD  12/16/21  11:21 EST

## 2021-12-16 NOTE — CASE MANAGEMENT/SOCIAL WORK
Case Management/Social Work    Patient Name:  Kentrell Cleaning  YOB: 1960  MRN: [id number]  Admit Date:  12/14/2021    Notified by Charge RN need for home 02.  Notified [employer] who will deliver portable to room once  Insurance is cleared.      Phone communication or documentation only - no physical contact with patient or family.          Electronically signed by:  Wendy Maxwell RN  12/16/21 14:04 EST

## 2021-12-16 NOTE — PAYOR COMM NOTE
"------Clinicals for Pending auth # [id number]      Please advise of determination and when additional clinicals are needed.    Thanks!    Akanksha Majano  Utilization Review Coordinator  [facility]  [address] [place]  [address]  Ph: [phone]  Fx: [phone]      Kentrell Celaning (61 y.o. Male)             Date of Birth Social Security Number Address Home Phone MRN    1960  [address] [place] [address] [phone] [id number]    Mosque Marital Status             None        Admission Date Admission Type Admitting Provider Attending Provider Department, Room/Bed    12/14/21 Emergency Parth Light MD Prakash, Shimoga, MD [facility] EMERGENCY DEPARTMENT, EDH4/4    Discharge Date Discharge Disposition Discharge Destination                         Attending Provider: Parth Light MD    Allergies: No Known Allergies    Isolation: Enh Drop/Con   Infection: COVID (confirmed) (12/14/21)   Code Status: CPR   Advance Care Planning Activity    Ht: 182.9 cm (72\")   Wt: 86.2 kg (190 lb)    Admission Cmt: None   Principal Problem: Pneumonia due to COVID-19 virus [U07.1,J12.82]               Active Insurance as of 12/14/2021     Primary Coverage     Payor Plan Insurance Group Employer/Plan Group    [employer] BLUE CROSS [facility] BLUE CROSS BLUE [facility] PPO [id number]     Payor Plan Address Payor Plan Phone Number Payor Plan Fax Number Effective Dates    [id number] [phone]  1/1/2021 - None Entered    [place] [address]       Subscriber Name Subscriber Birth Date Member ID       KENTRELL CLEANING 1960 [id number]               Emergency Contacts      (Rel.) Home Phone Work Phone Mobile Phone    LEWIS NAPOLES (Significant Other) -- -- [phone]            Viral Illness, Acute RRG - Inpatient Care by Masha Pham, RN         Met (Selected): Reviewed on 12/15/2021 by Masha Pham, RN       Created Using Review Status Review Entered   Indicia® " Completed 12/15/2021 11:56       Criteria Set Name - Subset   Viral Illness, Acute RRG - Inpatient Care       Selected?   Yes - Inpatient Care is selected for the Viral Illness, Acute RRG criteria set.      Criteria Review      Inpatient Care    Most Recent : Masha Pham Most Recent Date: 12/15/2021 11:56:04 EST    (X) Admission is indicated for  1 or more  of the following  [B] [G]:       (X) Pulmonary manifestation, as indicated by  1 or more  of the following :          (X) Hypoxemia    Notes:    12/15/2021 11:56:04 EST by Masha Pham    Subject: Admission    PER ER MD NOTES PATIENT'S OXYGEN SATS WERE 88-89% ON ROOM AIR - PT IS NOW ON 2L OXYGEN WITH SATS IN LOW 90s.      -----------------------------------------------------------------        [facility] EMERGENCY DEPARTMENT  [address] [facility] [place] [address]  Phone:  [phone]  Fax:          Patient:     Kentrell Cleaning MRN:  [id number]   [address] [place] [address] :  1960  SSN:    Phone: [phone] Sex:  M      INSURANCE PAYOR PLAN GROUP # SUBSCRIBER ID   Primary:    GENESIS ROONEY [id number] [facility] [id number]   Admitting Diagnosis: Pneumonia due to COVID-19 virus [U07.1, J12.82]  Order Date:  Dec 16, 2021        Inpatient Admission       (Order ID: [id number])     Diagnosis:         Priority:  Routine Expected Date:   Expiration Date:        Interval:   Count:    Level of Care: Med/Surg [1]  Diagnosis: Pneumonia due to COVID-19 virus [[id number]]  Admitting Physician: HARLAN VEGA [[id number]]  Attending Physician: HARLAN VEGA [[id number]]  Certification: I Certify That Inpatient Hospital Services Are Medically Necessary For Greater Than 2 Midnights     Specimen Type:   Specimen Source:   Specimen Taken Date:   Specimen Taken Time:             Authorizing Provider:Harlan Vega MD  Authorizing Provider's NPI: [id number]  Order Entered By: Harlan Vega MD 2021 11:46 AM     Electronically  signed by: Elmo Vega MD 2021 11:46 AM     ------------------------------------------------------------------------------------------------------------------------           History & Physical      [place], HEIDY Jewell at 21              [facility] Medicine Services      Patient Name: Kentrell Cleaning  : 1960  MRN: [id number]  Primary Care Physician:  Provider, No Known  Date of admission: 2021      Subjective      Chief Complaint: weakness    History of Present Illness: Kentrell Cleaning is a 61 y.o. male with Past medical history of hypertension who presented to [facility] on 2021 complaining of some increasing general weakness, decreased appetite decreased smell and taste over the last 2 days after his wife had Covid last week.  Patient complains of generalized weakness, with the left arm and leg weaker than the right.  He complains of mid back pain for the past 3 days, describes as an immediate dull ache.  Patient rates back pain 9 on sliding scale 0-10.  Patient also complains of fatigue, difficulty relating, loss of appetite, and loss of taste and smell.  Patient denies chest pain, shortness of air, cough, nausea, fever, chills.    In the ED, CT head showed no acute intracranial abnormality; there is paranasal sinus opacification running involving the frontal, ethmoid and maxillary sinuses; brain MRI is more sensitive.  Chest x-ray showed elevation of the right hemidiaphragm with subtle peripheral hazy groundglass opacities and interstitial thickening in both lungs, likely atypical/viral pneumonia.  EKG showed sinus rhythm.  COVID-19 positive.  All labs unremarkable except sodium 131, calcium 8.3, AST 46.  All vital signs unremarkable.  Patient received Decadron, IV fluid bolus in the ED.  Patient admitted to hospitalist service for further evaluation and treatment.    Review of Systems   Constitutional: Positive for decreased appetite and  malaise/fatigue. Negative for chills and fever.   HENT: Negative.    Eyes: Negative.    Cardiovascular: Negative.  Negative for chest pain.   Respiratory: Negative.  Negative for cough.    Endocrine: Negative.    Skin: Negative.    Musculoskeletal: Positive for back pain.   Gastrointestinal: Negative.  Negative for nausea.   Genitourinary: Negative.    Neurological: Positive for weakness.   Psychiatric/Behavioral: Negative.    Allergic/Immunologic: Negative.        Personal History     No past medical history on file.    No past surgical history on file.    Family History: family history is not on file. Otherwise pertinent FHx was reviewed and not pertinent to current issue.    Social History:      Home Medications:  Prior to Admission Medications     Prescriptions Last Dose Informant Patient Reported? Taking?    metoprolol tartrate (LOPRESSOR) 25 MG tablet   No No    Take 1 tablet by mouth 2 (Two) Times a Day.          Allergies:  No Known Allergies    Objective      Vitals:   Temp:  [98.9 °F (37.2 °C)] 98.9 °F (37.2 °C)  Heart Rate:  [82-90] 82  Resp:  [18-19] 18  BP: (115-124)/(82-86) 124/82  Flow (L/min):  [2] 2    Physical Exam  Vitals and nursing note reviewed.   Constitutional:       Appearance: Normal appearance.   HENT:      Head: Normocephalic.      Nose: Nose normal.      Mouth/Throat:      Pharynx: Oropharynx is clear.   Eyes:      Extraocular Movements: Extraocular movements intact.   Cardiovascular:      Rate and Rhythm: Normal rate and regular rhythm.      Pulses: Normal pulses.      Heart sounds: Normal heart sounds.   Pulmonary:      Effort: Pulmonary effort is normal.      Breath sounds: Normal breath sounds.   Abdominal:      General: Bowel sounds are normal.      Palpations: Abdomen is soft.   Musculoskeletal:         General: Normal range of motion.      Cervical back: Normal range of motion.   Skin:     General: Skin is warm and dry.   Neurological:      Mental Status: He is alert and oriented  to person, place, and time.   Psychiatric:         Mood and Affect: Mood normal.         Behavior: Behavior normal.      Result Review    Result Review:  I have personally reviewed the results from the time of this admission to 12/14/2021 20:57 EST and agree with these findings:  [x]  Laboratory  [x]  Microbiology  [x]  Radiology  [x]  EKG/Telemetry   []  Cardiology/Vascular   []  Pathology  []  Old records  []  Other:  Most notable findings include: As above    Assessment/Plan        Active Hospital Problems:  Active Hospital Problems    Diagnosis    • **Pneumonia due to COVID-19 virus    • Hypocalcemia    • Hyponatremia    • Generalized weakness    • Primary hypertension      Plan:    -----Home medication reconciliation not completed.  Will complete home med rec once nursing reviews.-----    Pneumonia due to COVID-19 virus  -COVID-19 positive  -Chest x-ray reviewed  -EKG reviewed  -AST 46  -Initial troponin negative  -Serial troponins ordered  -Lactic ordered  -Blood cultures x2 ordered  -Sputum culture ordered  -Urine for Legionella and strep pneumo ordered  -Ferritin, D-dimer, procalcitonin, CRP ordered  -PT/INR, PTT ordered  -BNP ordered  -Magnesium ordered  -Enhanced droplet/contact precautions  -Continuous cardiac monitoring  -Continuous pulse ox  -Decadron given in the ED, continue  -Albuterol inhaler, Tessalon, Robitussin ordered  -Remdesivir ordered    Generalized weakness  -Patient reports left-sided weakness greater than right  -CT head reviewed  -Fall precautions  -MRI brain ordered    Primary hypertension  -BP well-controlled  -Lipid panel ordered    Hypocalcemia  -Calcium 8.3  -Ionized calcium ordered    Hyponatremia  -Sodium 131  -Serum osmolality, urine osmolality, urine sodium ordered    DVT prophylaxis:  Medical DVT prophylaxis orders are present.    CODE STATUS:    Code Status (Patient has no pulse and is not breathing): CPR (Attempt to Resuscitate)  Medical Interventions (Patient has pulse or is  breathing): Full Support    Admission Status:  I believe this patient meets observation status.    I discussed the patient's findings and my recommendations with patient and family.    This patient has been examined wearing appropriate Personal Protective Equipment. 12/14/21      Signature: Electronically signed by HEIDY Erickson, 12/14/21, 11:56 PM EST.      Electronically signed by Denise Aviles APRN at 12/15/21 0030          Emergency Department Notes      Boy Lamb MD at 12/14/21 1614          Subjective   History of Present Illness  Exposure to Covid, general weakness  61-year-old male states had some increasing general weakness, decreased appetite decreased smell and taste over the last 2 days after his wife had Covid last week.  He states he felt slightly feverish.  States he is also had some difficulty walking due to some weakness mainly on the left side arm and leg weakness for the last 2 days.  He reports no stiff neck or photophobia.  He has had cough without shortness of breath.  Review of Systems   Constitutional: Positive for fatigue and fever.   HENT: Negative.    Eyes: Negative.    Respiratory: Positive for cough.    Cardiovascular: Negative.    Gastrointestinal: Negative.    Genitourinary: Negative.    Musculoskeletal: Negative.    Skin: Negative.    Neurological: Positive for weakness. Negative for headaches.   Psychiatric/Behavioral: Negative.        No past medical history on file.  Hypertension  No Known Allergies    No past surgical history on file.    No family history on file.    Social History     Socioeconomic History   • Marital status:    Uses vape    Prior to Admission medications    Medication Sig Start Date End Date Taking? Authorizing Provider   metoprolol tartrate (LOPRESSOR) 25 MG tablet Take 1 tablet by mouth 2 (Two) Times a Day. 1/10/21   Boy Lamb MD     /82 (BP Location: Left arm, Patient Position: Lying)   Pulse 82   Temp 98.9 °F (37.2  "°C) (Oral)   Resp 18   Ht 182.9 cm (72\")   Wt 86.2 kg (190 lb)   SpO2 93%   BMI 25.77 kg/m²   I examined the patient using the appropriate personal protective equipment.      Objective   Physical Exam  General: Well-developed well-appearing, no acute distress, alert and appropriate  Eyes: Pupils round and reactive, sclera nonicteric  HEENT: Mucous membranes somewhat dry, no mucosal swelling  Neck: Supple, no nuchal rigidity, no lymphadenopathy  Respirations: Some coarse breath sounds bilaterally, respirations mildly tachypneic  Heart regular rate and rhythm, no murmurs rubs or gallops,   Abdomen soft nontender nondistended, no hepatosplenomegaly, no hernia, no mass, normal bowel sounds, no CVA tenderness  Extremities no clubbing cyanosis or edema, calves are symmetric and nontender  Neuro cranial nerves normal bilaterally, equal strength in bilateral upper extremities, weakness in bilateral extremities   Psych oriented, pleasant affect  Skin no rash, brisk cap refill  Procedures      ED Course      Results for orders placed or performed during the hospital encounter of 12/14/21   COVID-19,CEPHEID/BIJAN,COR/DEQUAN/PAD/MATT IN-HOUSE(OR EMERGENT/ADD-ON),NP SWAB IN TRANSPORT MEDIA 3-4 HR TAT, RT-PCR - Swab, Nasopharynx    Specimen: Nasopharynx; Swab   Result Value Ref Range    COVID19 Detected (C) Not Detected - Ref. Range   Comprehensive Metabolic Panel    Specimen: Blood   Result Value Ref Range    Glucose 97 65 - 99 mg/dL    BUN 11 6 - 20 mg/dL    Creatinine 0.73 (L) 0.76 - 1.27 mg/dL    Sodium 131 (L) 136 - 145 mmol/L    Potassium 5.0 3.5 - 5.2 mmol/L    Chloride 93 (L) 98 - 107 mmol/L    CO2 25.0 22.0 - 29.0 mmol/L    Calcium 8.3 (L) 8.6 - 10.5 mg/dL    Total Protein 6.7 6.0 - 8.5 g/dL    Albumin 3.40 (L) 3.50 - 5.20 g/dL    ALT (SGPT) 41 1 - 41 U/L    AST (SGOT) 46 (H) 1 - 40 U/L    Alkaline Phosphatase 107 39 - 117 U/L    Total Bilirubin 0.7 0.0 - 1.2 mg/dL    eGFR Non African Amer 110 >60 mL/min/1.73    Globulin " 3.3 gm/dL    A/G Ratio 1.0 g/dL    BUN/Creatinine Ratio 15.1 7.0 - 25.0    Anion Gap 13.0 5.0 - 15.0 mmol/L   Troponin    Specimen: Blood   Result Value Ref Range    Troponin T <0.010 0.000 - 0.030 ng/mL   CBC Auto Differential    Specimen: Blood   Result Value Ref Range    WBC 4.90 3.40 - 10.80 10*3/mm3    RBC 5.71 4.14 - 5.80 10*6/mm3    Hemoglobin 18.9 (H) 13.0 - 17.7 g/dL    Hematocrit 55.9 (H) 37.5 - 51.0 %    MCV 97.9 (H) 79.0 - 97.0 fL    MCH 33.1 (H) 26.6 - 33.0 pg    MCHC 33.8 31.5 - 35.7 g/dL    RDW 15.6 (H) 12.3 - 15.4 %    RDW-SD 52.5 37.0 - 54.0 fl    MPV 8.2 6.0 - 12.0 fL    Platelets 194 140 - 450 10*3/mm3    Neutrophil % 72.1 42.7 - 76.0 %    Lymphocyte % 13.4 (L) 19.6 - 45.3 %    Monocyte % 14.2 (H) 5.0 - 12.0 %    Eosinophil % 0.0 (L) 0.3 - 6.2 %    Basophil % 0.3 0.0 - 1.5 %    Neutrophils, Absolute 3.50 1.70 - 7.00 10*3/mm3    Lymphocytes, Absolute 0.70 0.70 - 3.10 10*3/mm3    Monocytes, Absolute 0.70 0.10 - 0.90 10*3/mm3    Eosinophils, Absolute 0.00 0.00 - 0.40 10*3/mm3    Basophils, Absolute 0.00 0.00 - 0.20 10*3/mm3    nRBC 0.1 0.0 - 0.2 /100 WBC   Scan Slide    Specimen: Blood   Result Value Ref Range    RBC Morphology Normal Normal    WBC Morphology Normal Normal    Platelet Morphology Normal Normal   ECG 12 Lead   Result Value Ref Range    QT Interval 387 ms   Gold Top - SST   Result Value Ref Range    Extra Tube Hold for add-ons.    Light Blue Top   Result Value Ref Range    Extra Tube hold for add-on      CT Head Without Contrast    Result Date: 12/14/2021  No acute intracranial abnormality. There is paranasal sinus opacification running involving the frontal, ethmoid and maxillary sinuses. Brain MRI is more sensitive to evaluate for acute or subacute infarcts and to evaluate for intracranial metastatic disease.  Electronically Signed By-Cheyenne Andersen MD On:12/14/2021 6:06 PM This report was finalized on [id number] by  Cheyenne Andersen MD.    XR Chest 1 View    Result Date:  12/14/2021  Elevation of the right hemidiaphragm with subtle peripheral hazy groundglass opacities and interstitial thickening in both lungs, likely atypical/viral pneumonia.  Electronically Signed By-Keenan Palma MD On:12/14/2021 5:07 PM This report was finalized on [id number] by  Keenan Palma MD.     My EKG interpretation sinus rhythm rate of 79     MDM  Upon my initial valuation patient was satting in the 88-89% range on room air and was placed on nasal cannula support 2 L.  He was positive for Covid.  Chest x-ray shows Covid infiltrate.  CT scan of the head was ordered due to patient's description of some weakness of the left side greater than the right side however on neurologic exam he seems to be generally weak all over without focal or lateralizing deficits.  His CT scan of the head shows no acute intracranial normality.  Due to patient's hypoxia he will be admitted the hospital for further care for his Covid illness.  Notably he has not been vaccinated.  Hospital service was paged for admission.  Final diagnoses:   Pneumonia due to COVID-19 virus   Hypoxia   Weakness       ED Disposition  ED Disposition     ED Disposition Condition Comment    Decision to Admit  Level of Care: Telemetry [5]   Admitting Physician: HARLAN DELGADO [[id number]]            No follow-up provider specified.       Medication List      No changes were made to your prescriptions during this visit.          Boy Lamb MD  12/14/21 1823       Boy Lamb MD  12/14/21 1823      Electronically signed by Boy Lamb MD at 12/14/21 1823         Respiratory Therapy (last 48 hours)     Respiratory Therapy Flowsheet     Row Name 12/16/21 1122 12/16/21 1113 12/16/21 1004 12/16/21 0739 12/16/21 0400       Oxygen Therapy    SpO2 -- 92 % -- -- 93 %    Pulse Oximetry Type -- Continuous -- -- Continuous    Device (Oxygen Therapy) -- nasal cannula -- nasal cannula nasal cannula    Flow (L/min) -- 2 -- 2 2    SF Ratio -- -- 243  -- --       Vital Signs    Temp 97.6 °F (36.4 °C) -- -- -- 97.2 °F (36.2 °C)    Temp src Oral -- -- -- Oral    Pulse -- 73 -- -- 62    Heart Rate Source -- Monitor -- -- Monitor    Resp -- 14 -- -- 18    Resp Rate Source -- Monitor -- -- Visual    /89 -- -- -- --    BP Location Right arm -- -- -- --    BP Method Automatic -- -- -- --    Patient Position Lying -- -- -- --       Assessment    Respiratory WDL -- -- -- WDL except; cough WDL except; cough    Cough Frequency -- -- -- infrequent frequent    Cough Type -- -- -- dry productive    Row Name 12/16/21 0000 12/15/21 2000 12/15/21 1804 12/15/21 1554 12/15/21 1405       Oxygen Therapy    Device (Oxygen Therapy) nasal cannula nasal cannula -- nasal cannula --    Flow (L/min) 2 2 -- 2 --    SF Ratio -- -- 207 -- 207       Assessment    Respiratory WDL WDL except; cough WDL except; cough -- WDL except; cough --    Cough Frequency frequent frequent -- frequent --    Cough Type productive productive -- productive --       Treatment/Therapy    Cough And Deep Breathing done independently per patient done independently per patient -- done independently per patient --       Care Plan Interventions    Supportive Measures -- active listening utilized -- self-care encouraged --    Row Name 12/15/21 1207 12/15/21 1200 12/15/21 1005 12/15/21 0802 12/15/21 0800       Oxygen Therapy    SpO2 90 % -- -- 93 % --    Pulse Oximetry Type Continuous -- -- Continuous --    Device (Oxygen Therapy) nasal cannula nasal cannula -- nasal cannula nasal cannula    Flow (L/min) 2 2 -- 2 2    SF Ratio -- -- 243 -- --       Vital Signs    Temp 97.8 °F (36.6 °C) -- -- 97.5 °F (36.4 °C) --    Temp src Oral -- -- Oral --    Pulse 87 -- -- 78 --    Heart Rate Source Monitor -- -- Monitor --    Resp 17 -- -- 18 --    Resp Rate Source Monitor -- -- Monitor --    /96 -- -- 146/96 --    Noninvasive MAP (mmHg) 96 -- -- 110 --    BP Location Right arm -- -- Right arm --    BP Method Automatic --  -- Automatic --    Patient Position Lying -- -- Lying --       Assessment    Respiratory WDL -- WDL except; cough -- -- WDL except; cough    Cough Frequency -- frequent -- -- frequent    Cough Type -- productive -- -- productive       Treatment/Therapy    Cough And Deep Breathing -- done independently per patient -- -- done independently per patient       Care Plan Interventions    Supportive Measures -- self-care encouraged -- -- self-care encouraged    Row Name 12/15/21 0605 12/15/21 0300 12/15/21 0000 21 16:27:21       Oxygen Therapy    SpO2 -- 92 % -- 95 % 93 %    Pulse Oximetry Type -- Continuous -- Continuous Continuous    Device (Oxygen Therapy) -- nasal cannula -- nasal cannula nasal cannula    Flow (L/min) -- 2 -- 2 2    SF Ratio 229 -- -- -- --       Vital Signs    Temp -- 97.8 °F (36.6 °C) -- 98.3 °F (36.8 °C) --    Temp src -- Oral -- Oral --    Pulse -- 87 -- 90 82    Heart Rate Source -- Monitor -- Monitor Monitor    Resp -- 18 -- 18 18    Resp Rate Source -- Visual -- Visual Visual    BP -- 134/93 -- 139/87 124/82    Noninvasive MAP (mmHg) -- 106 -- 103 88    BP Location -- Left arm -- Left arm Left arm    BP Method -- Automatic -- Automatic Automatic    Patient Position -- Lying -- Lying Lying       Assessment    Respiratory WDL -- -- WDL except; cough WDL except; cough --    Cough Frequency -- -- infrequent infrequent --    Cough Type -- -- productive productive --       Treatment/Therapy    Cough And Deep Breathing -- -- -- done independently per patient --    Row Name 21 16:14:55                   Assessment    Cough Type productive; congested                     Physician Progress Notes (last 48 hours)      Parth Light MD at 12/15/21 1448               [facility] Medicine Services        Patient Name: Kentrell Cleaning  : 1960  MRN: [id number]  Primary Care Physician:  Provider, No Known  Date of admission: 2021        Subjective        Chief Complaint: weakness     History of Present Illness: Kentrell Cleaning is a 61 y.o. male with Past medical history of hypertension who presented to [facility] on 12/14/2021 complaining of some increasing general weakness, decreased appetite decreased smell and taste over the last 2 days after his wife had Covid last week.  Patient complains of generalized weakness, with the left arm and leg weaker than the right.  He complains of mid back pain for the past 3 days, describes as an immediate dull ache.  Patient rates back pain 9 on sliding scale 0-10.  Patient also complains of fatigue, difficulty relating, loss of appetite, and loss of taste and smell.  Patient denies chest pain, shortness of air, cough, nausea, fever, chills.     In the ED, CT head showed no acute intracranial abnormality; there is paranasal sinus opacification running involving the frontal, ethmoid and maxillary sinuses; brain MRI is more sensitive.  Chest x-ray showed elevation of the right hemidiaphragm with subtle peripheral hazy groundglass opacities and interstitial thickening in both lungs, likely atypical/viral pneumonia.  EKG showed sinus rhythm.  COVID-19 positive.  All labs unremarkable except sodium 131, calcium 8.3, AST 46.  All vital signs unremarkable.  Patient received Decadron, IV fluid bolus in the ED.  Patient admitted to hospitalist service for further evaluation and treatment.   Hospital course;  12/15/2021; patient denies any left upper or lower extremity weakness, no speech or swallow issues, hemodynamically stable, elevated D-dimer, will check venous Dopplers bilateral lower extremity, neurology has been consulted await MRI of the brain.  Review of Systems   Constitutional: Positive for decreased appetite and malaise/fatigue. Negative for chills and fever.   HENT: Negative.    Eyes: Negative.    Cardiovascular: Negative.  Negative for chest pain.   Respiratory: Negative.  Negative for cough.    Endocrine:  Negative.    Skin: Negative.    Musculoskeletal: Positive for back pain.   Gastrointestinal: Negative.  Negative for nausea.   Genitourinary: Negative.    Neurological: Positive for weakness.   Psychiatric/Behavioral: Negative.    Allergic/Immunologic: Negative.          Personal History      Medical History   No past medical history on file.        Surgical History   No past surgical history on file.        Family History: family history is not on file. Otherwise pertinent FHx was reviewed and not pertinent to current issue.     Social History:       Home Medications:           Prior to Admission Medications      Prescriptions Last Dose Informant Patient Reported? Taking?     metoprolol tartrate (LOPRESSOR) 25 MG tablet     No No     Take 1 tablet by mouth 2 (Two) Times a Day.                Allergies:  No Known Allergies     Objective       Vitals:   Temp:  [97.5 °F (36.4 °C)-98.3 °F (36.8 °C)] 97.8 °F (36.6 °C)  Heart Rate:  [78-90] 87  Resp:  [17-18] 17  BP: (124-151)/(82-96) 151/96  Flow (L/min):  [2] 2  Physical Exam  Vitals and nursing note reviewed.   Constitutional:       Appearance: Normal appearance.   HENT:      Head: Normocephalic.      Nose: Nose normal.      Mouth/Throat:      Pharynx: Oropharynx is clear.   Eyes:      Extraocular Movements: Extraocular movements intact.   Cardiovascular:      Rate and Rhythm: Normal rate and regular rhythm.      Pulses: Normal pulses.      Heart sounds: Normal heart sounds.   Pulmonary:      Effort: Pulmonary effort is normal.      Breath sounds: Normal breath sounds.   Abdominal:      General: Bowel sounds are normal.      Palpations: Abdomen is soft.   Musculoskeletal:         General: Normal range of motion.      Cervical back: Normal range of motion.  Bilateral upper and lower extremity strength normal  Skin:     General: Skin is warm and dry.   Neurological:      Mental Status: He is alert and oriented to person, place, and time.   Psychiatric:         Mood and  Affect: Mood normal.         Behavior: Behavior normal.            Result Review    Result Review:  I have personally reviewed the results from the time of this admission to 12/14/2021 20:57 EST and agree with these findings:  [x]?  Laboratory  [x]?  Microbiology  [x]?  Radiology  [x]?  EKG/Telemetry   []?  Cardiology/Vascular   []?  Pathology  []?  Old records  []?  Other:  Most notable findings include: As above     Lab Results   Component Value Date    GLUCOSE 113 (H) 12/15/2021    CALCIUM 8.3 (L) 12/15/2021     (L) 12/15/2021    K 5.3 (H) 12/15/2021    CO2 23.0 12/15/2021    CL 98 12/15/2021    BUN 13 12/15/2021    CREATININE 0.66 (L) 12/15/2021    EGFRIFNONA 124 12/15/2021    BCR 19.7 12/15/2021    ANIONGAP 12.0 12/15/2021     Lab Results   Component Value Date    WBC 3.50 12/15/2021    HGB 19.0 (H) 12/15/2021    HCT 56.4 (H) 12/15/2021    MCV 98.0 (H) 12/15/2021     12/15/2021       Assessment/Plan          Active Hospital Problems:       Active Hospital Problems     Diagnosis     • **Pneumonia due to COVID-19 virus     • Hypocalcemia     • Hyponatremia     • Generalized weakness     • Primary hypertension        Plan:        Pneumonia due to COVID-19 virus with hypoxia  -COVID-19 positive  -Chest x-ray reviewed  -EKG reviewed  -AST 46  -Initial troponin negative  -Serial troponins ordered  -Lactic acid normal.  -Blood cultures x2 ordered  -Sputum culture ordered,  -Urine for Legionella and strep pneumo, both negative.  -Ferritin elevated,  procalcitonin normal, CRP elevated at 11.44  -PT/INR, PTT ordered  -BNP ordered  -Magnesium ordered  -Enhanced droplet/contact precautions  -Continuous cardiac monitoring  -Continuous pulse ox  -Decadron given in the ED, continue  -Albuterol inhaler, Tessalon, Robitussin ordered  -Remdesivir started on 12/14/2021   Elevated D-dimer; CT angiogram of the chest, negative for PE severe emphysema, pulmonary fibrosis in the perihilar and basilar lungs, borderline  ascending aortic aneurysm measuring 4 cm in diameter.,  Will check venous Dopplers ultrasound bilateral lower extremity  Generalized weakness/CVA versus TIA  -Patient reports left-sided weakness greater than right  -CT head no acute intracranial abnormality noted  -Fall precautions  -MRI brain ordered, pending, neurology consulted.     Primary hypertension  -BP well-controlled  -Lipid panel ordered     Hypocalcemia  -Calcium 8.3  -Ionized calcium 1.11, replace as per protocol.     Hyponatremia likely dilutional; will fluid restrict to 1500 mL per 24 hours  -Sodium 131--> 133  -Serum osmolality low, urine osmolality normal, urine sodium 69   Hyperkalemia mild; recheck  DVT prophylaxis:  Medical DVT prophylaxis orders are present.     CODE STATUS:    Code Status (Patient has no pulse and is not breathing): CPR (Attempt to Resuscitate)  Medical Interventions (Patient has pulse or is breathing): Full Support     Admission Status:  I believe this patient meets observation status.     I discussed the patient's findings and my recommendations with patient and family.     This patient has been examined wearing appropriate Personal Protective Equipment.    Electronically signed by Parth Light MD, 12/15/21, 2:58 PM EST.        Electronically signed by Parth Light MD at 12/15/21 1458          Consult Notes (last 48 hours)      Mary Jauregui MD at 12/16/21 1121      Consult Orders    1. Inpatient Neurology Consult Stroke [[id number]] ordered by Parth Light MD at 12/15/21 1311                   Primary Care Provider: Provider, No Known     Consult requested by:  KURTIS Light MD    Reason for Consultation: Neurological evaluation for stroke and left sided weakness.     Kentrell Cleaning is a 61 y.o. male *    History taken from: patient chart RN    Chief complaint: leg weakness.     Subjective   SUBJECTIVE:    History of present illness:  The patient is a 61 year old gentleman with history of hypertension who presented  to [facility] on [facility]14/21 secondary to increase general weakness, decrease in appetite and decreased smell for last 2 days.  His wife was diagnosed with COVID -19 recently.  He also complained of left arm and leg weakness.  He was unable to walk and called EMS to come to hospital.  He also had dull ache in the back.  The patient was positive for Covid disease.  He also had a MRI of Brain which was unremarkable for acute stroke.  On evaluation his weakness has improved, he is able to raise his legs while sitting.  He denies vision problems, speech and swallowing problems, bowel and bladder problems.     Review of Systems   Constitutional: Negative  HENT: Negative.    Eyes: Negative.    Respiratory: Negative.    Cardiovascular: Negative.    Gastrointestinal: Negative.    Genitourinary: Negative.    Musculoskeletal: weakness  Skin: Negative.    Neurological: Negative.    Hematological: Negative.    Psychiatric/Behavioral: Negative.        PATIENT HISTORY:  History reviewed. No pertinent past medical history., History reviewed. No pertinent surgical history.,   Family History   Problem Relation Age of Onset   • Hypertension Mother    • Heart disease Mother    • Hypertension Father    • Heart disease Father    ,   Social History     Tobacco Use   • Smoking status: Current Every Day Smoker   • Smokeless tobacco: Never Used   • Tobacco comment: E-cigarettes   Substance Use Topics   • Alcohol use: Yes     Alcohol/week: 2.0 standard drinks     Types: 2 Shots of liquor per week   • Drug use: Yes     Types: Marijuana   , (Not in a hospital admission)  , Scheduled Meds:  dexamethasone, 6 mg, Oral, Daily   Or  dexamethasone, 6 mg, Intravenous, Daily  enoxaparin, 40 mg, Subcutaneous, Daily  remdesivir, 100 mg, Intravenous, Q24H  sodium chloride, 10 mL, Intravenous, Q12H    , Continuous Infusions:  Pharmacy Consult - Remdesivir,     , PRN Meds:  •  acetaminophen **OR** acetaminophen **OR** acetaminophen  •  albuterol sulfate HFA  •   artificial tears  •  benzonatate  •  guaiFENesin-dextromethorphan  •  ketorolac  •  magnesium sulfate **OR** magnesium sulfate in D5W 1g/100mL (PREMIX)  •  nitroglycerin  •  ondansetron **OR** ondansetron  •  Pharmacy Consult - Remdesivir  •  potassium chloride  •  potassium chloride  •  [COMPLETED] Insert peripheral IV **AND** sodium chloride  •  sodium chloride, Allergies:  Patient has no known allergies.    ________________________________________________________     Objective   OBJECTIVE:  Upon today's exam, The patient is sitting in bed in no apparent distress.  Head NC, AT, Neck supple, good pulmonary effort. CV  S1-S2 no murmur.  Abdomen soft, non tender.  Ext no edema.          Neurologic Exam  The patient is awake, alert, oriented x 3, speech is fluent with good comprehension, follow commands.  CN EOMI, no facial droop. Tongue midline. Motor upper extremities 5-/5.  Hip flexors 3+/5.  Hip add/abd 4/5.  Reflexes absent, plantar mute.  Sensory light touch intact.  Cerebellum finger to nose intact.   ________________________________________________________   RESULTS REVIEW:    VITAL SIGNS:   Temp:  [97.2 °F (36.2 °C)-97.8 °F (36.6 °C)] 97.2 °F (36.2 °C)  Heart Rate:  [62-87] 73  Resp:  [14-18] 14  BP: (151)/(96) 151/96     LABS:  WBC   Date Value Ref Range Status   12/16/2021 4.90 3.40 - 10.80 10*3/mm3 Final     RBC   Date Value Ref Range Status   12/16/2021 5.24 4.14 - 5.80 10*6/mm3 Final     Hemoglobin   Date Value Ref Range Status   12/16/2021 17.5 13.0 - 17.7 g/dL Final     Hematocrit   Date Value Ref Range Status   12/16/2021 51.2 (H) 37.5 - 51.0 % Final     MCV   Date Value Ref Range Status   12/16/2021 97.8 (H) 79.0 - 97.0 fL Final     MCH   Date Value Ref Range Status   12/16/2021 33.4 (H) 26.6 - 33.0 pg Final     MCHC   Date Value Ref Range Status   12/16/2021 34.2 31.5 - 35.7 g/dL Final     RDW   Date Value Ref Range Status   12/16/2021 15.5 (H) 12.3 - 15.4 % Final     RDW-SD   Date Value Ref Range  Status   12/16/2021 52.1 37.0 - 54.0 fl Final     MPV   Date Value Ref Range Status   12/16/2021 7.5 6.0 - 12.0 fL Final     Platelets   Date Value Ref Range Status   12/16/2021 237 140 - 450 10*3/mm3 Final     Neutrophil %   Date Value Ref Range Status   12/16/2021 69.3 42.7 - 76.0 % Final     Lymphocyte %   Date Value Ref Range Status   12/16/2021 14.8 (L) 19.6 - 45.3 % Final     Monocyte %   Date Value Ref Range Status   12/16/2021 15.8 (H) 5.0 - 12.0 % Final     Eosinophil %   Date Value Ref Range Status   12/16/2021 0.0 (L) 0.3 - 6.2 % Final     Basophil %   Date Value Ref Range Status   12/16/2021 0.1 0.0 - 1.5 % Final     Neutrophils, Absolute   Date Value Ref Range Status   12/16/2021 3.40 1.70 - 7.00 10*3/mm3 Final     Lymphocytes, Absolute   Date Value Ref Range Status   12/16/2021 0.70 0.70 - 3.10 10*3/mm3 Final     Monocytes, Absolute   Date Value Ref Range Status   12/16/2021 0.80 0.10 - 0.90 10*3/mm3 Final     Eosinophils, Absolute   Date Value Ref Range Status   12/16/2021 0.00 0.00 - 0.40 10*3/mm3 Final     Basophils, Absolute   Date Value Ref Range Status   12/16/2021 0.00 0.00 - 0.20 10*3/mm3 Final     nRBC   Date Value Ref Range Status   12/16/2021 0.2 0.0 - 0.2 /100 WBC Final     Glucose   Date Value Ref Range Status   12/16/2021 99 65 - 99 mg/dL Final     BUN   Date Value Ref Range Status   12/16/2021 16 6 - 20 mg/dL Final     Creatinine   Date Value Ref Range Status   12/16/2021 0.75 (L) 0.76 - 1.27 mg/dL Final     Sodium   Date Value Ref Range Status   12/16/2021 135 (L) 136 - 145 mmol/L Final     Potassium   Date Value Ref Range Status   12/16/2021 4.4 3.5 - 5.2 mmol/L Final     Chloride   Date Value Ref Range Status   12/16/2021 100 98 - 107 mmol/L Final     CO2   Date Value Ref Range Status   12/16/2021 24.0 22.0 - 29.0 mmol/L Final     Calcium   Date Value Ref Range Status   12/16/2021 8.1 (L) 8.6 - 10.5 mg/dL Final     Total Protein   Date Value Ref Range Status   12/16/2021 5.9 (L) 6.0 -  8.5 g/dL Final     Albumin   Date Value Ref Range Status   12/16/2021 2.80 (L) 3.50 - 5.20 g/dL Final     ALT (SGPT)   Date Value Ref Range Status   12/16/2021 48 (H) 1 - 41 U/L Final     AST (SGOT)   Date Value Ref Range Status   12/16/2021 49 (H) 1 - 40 U/L Final     Alkaline Phosphatase   Date Value Ref Range Status   12/16/2021 92 39 - 117 U/L Final     Total Bilirubin   Date Value Ref Range Status   12/16/2021 0.5 0.0 - 1.2 mg/dL Final     eGFR Non  Amer   Date Value Ref Range Status   12/16/2021 107 >60 mL/min/1.73 Final     BUN/Creatinine Ratio   Date Value Ref Range Status   12/16/2021 21.3 7.0 - 25.0 Final     Anion Gap   Date Value Ref Range Status   12/16/2021 11.0 5.0 - 15.0 mmol/L Final       Lab Results   Component Value Date    LDL 44 12/14/2021         IMAGING STUDIES:  CT Head Without Contrast    Result Date: 12/14/2021  No acute intracranial abnormality. There is paranasal sinus opacification running involving the frontal, ethmoid and maxillary sinuses. Brain MRI is more sensitive to evaluate for acute or subacute infarcts and to evaluate for intracranial metastatic disease.  Electronically Signed By-Cheyenne Andersen MD On:12/14/2021 6:06 PM This report was finalized on [id number] by  Cheyenne Andersen MD.    MRI Brain Without Contrast    Result Date: 12/15/2021  No restricted diffusion to suggest acute or subacute infarct. No intracranial hemorrhage. Pansinusitis.  Electronically Signed By-Cheyenne Andersen MD On:12/15/2021 5:22 PM This report was finalized on [id number] by  Cheyenne Andersen MD.    XR Chest 1 View    Result Date: 12/14/2021  Elevation of the right hemidiaphragm with subtle peripheral hazy groundglass opacities and interstitial thickening in both lungs, likely atypical/viral pneumonia.  Electronically Signed By-Keenan Palma MD On:12/14/2021 5:07 PM This report was finalized on [id number] by  Keenan Palma MD.    CT Chest Pulmonary Embolism    Result Date: 12/15/2021  1. No evidence of  pulmonary embolism. No evidence of an acute intrathoracic process. 2. Severe emphysema. 3. Pulmonary fibrosis in the perihilar and basilar lungs. 4. Borderline ascending aortic aneurysm measuring 4 cm in diameter. 5. Cardiomegaly and atherosclerosis. Electronically signed by:  Geronimo Sung M.D.  12/14/2021 10:47 PM      I reviewed the patient's new clinical results.      ________________________________________________________     PROBLEM LIST:    Pneumonia due to COVID-19 virus    Primary hypertension    Hypocalcemia    Hyponatremia    Generalized weakness          Assessment/Plan   ASSESSMENT/PLAN:  The patient is a 61 year old gentleman with history of hypertension who had developed covid 19 disease and its infection was detected.  He has been having generalized weakness, mostly in lower extremities especially proximal muscles of hip girdle.  The MRI of Brain is unremarkable for acute stroke.  He has clinical features suggestive of myopathy that may be related to COVID 19 disease.   Rec:  Will obtain, CPK and Aldolase.  Will follow.     Modification of stroke risk factors:   - Blood pressure should be less than 130/80 outpatient, HbA1c less than 6.5, LDL less than 70; b12>500 and smoking cessation if applicable. We would be grateful if the primary team / primary care physician would keep a close watch on the above targets.  - Stroke education  - Follow up with neurologist of choice      I discussed the patient's findings and my recommendations with patient and nursing staff    Mary Jauregui MD  12/16/21  11:21 EST          Electronically signed by Mary Jauregui MD at 12/16/21 2409

## 2021-12-16 NOTE — DISCHARGE SUMMARY
[facility] Medicine Services        Patient Name: Kentrell Cleaning  : 1960  MRN: [id number]  Primary Care Physician:  Provider, No Known  Date of admission: 2021   Date of discharge; 2021     Subjective       Chief Complaint: weakness     History of Present Illness: Kentrell Cleaning is a 61 y.o. male with Past medical history of hypertension who presented to [facility] on 2021 complaining of some increasing general weakness, decreased appetite decreased smell and taste over the last 2 days after his wife had Covid last week.  Patient complains of generalized weakness, with the left arm and leg weaker than the right.  He complains of mid back pain for the past 3 days, describes as an immediate dull ache.  Patient rates back pain 9 on sliding scale 0-10.  Patient also complains of fatigue, difficulty relating, loss of appetite, and loss of taste and smell.  Patient denies chest pain, shortness of air, cough, nausea, fever, chills.     In the ED, CT head showed no acute intracranial abnormality; there is paranasal sinus opacification running involving the frontal, ethmoid and maxillary sinuses; brain MRI is more sensitive.  Chest x-ray showed elevation of the right hemidiaphragm with subtle peripheral hazy groundglass opacities and interstitial thickening in both lungs, likely atypical/viral pneumonia.  EKG showed sinus rhythm.  COVID-19 positive.  All labs unremarkable except sodium 131, calcium 8.3, AST 46.  All vital signs unremarkable.  Patient received Decadron, IV fluid bolus in the ED.  Patient admitted to hospitalist service for further evaluation and treatment.   Hospital course;  12/15/2021; patient denies any left upper or lower extremity weakness, no speech or swallow issues, hemodynamically stable, elevated D-dimer, will check venous Dopplers bilateral lower extremity, neurology has been consulted await MRI of the brain.  2021; feels his lower  extremity weakness has improved MRI of the brain not conclusive of stroke, likely myopathy from COVID-19 causing weakness as per neurology, patient had home O2 evaluation and will send him home on oxygen.  Review of Systems   Constitutional: Positive for decreased appetite and malaise/fatigue. Negative for chills and fever.   HENT: Negative.    Eyes: Negative.    Cardiovascular: Negative.  Negative for chest pain.   Respiratory: Negative.  Negative for cough.    Endocrine: Negative.    Skin: Negative.    Musculoskeletal: Positive for back pain.   Gastrointestinal: Negative.  Negative for nausea.   Genitourinary: Negative.    Neurological: Positive for weakness.   Psychiatric/Behavioral: Negative.    Allergic/Immunologic: Negative.          Personal History      Medical History   No past medical history on file.         Surgical History   No past surgical history on file.         Family History: family history is not on file. Otherwise pertinent FHx was reviewed and not pertinent to current issue.     Social History:       Home Medications:                               Prior to Admission Medications      Prescriptions Last Dose Informant Patient Reported? Taking?     metoprolol tartrate (LOPRESSOR) 25 MG tablet     No No     Take 1 tablet by mouth 2 (Two) Times a Day.                Allergies:  No Known Allergies     Objective       Vitals:   Temp:  [97.2 °F (36.2 °C)-97.6 °F (36.4 °C)] 97.6 °F (36.4 °C)  Heart Rate:  [62-73] 73  Resp:  [14-18] 14  BP: (148)/(89) 148/89  Flow (L/min):  [2] 2  Physical Exam  Vitals and nursing note reviewed.   Constitutional:       Appearance: Normal appearance.   HENT:      Head: Normocephalic.      Nose: Nose normal.      Mouth/Throat:      Pharynx: Oropharynx is clear.   Eyes:      Extraocular Movements: Extraocular movements intact.   Cardiovascular:      Rate and Rhythm: Normal rate and regular rhythm.      Pulses: Normal pulses.      Heart sounds: Normal heart sounds.    Pulmonary:      Effort: Pulmonary effort is normal.      Breath sounds: Normal breath sounds.   Abdominal:      General: Bowel sounds are normal.      Palpations: Abdomen is soft.   Musculoskeletal:         General: Normal range of motion.      Cervical back: Normal range of motion.  Bilateral upper and lower extremity strength normal  Skin:     General: Skin is warm and dry.   Neurological:      Mental Status: He is alert and oriented to person, place, and time.   Psychiatric:         Mood and Affect: Mood normal.         Behavior: Behavior normal.            Result Review    Result Review:  I have personally reviewed the results from the time of this admission to 12/14/2021 20:57 EST and agree with these findings:  [x]??  Laboratory  [x]??  Microbiology  [x]??  Radiology  [x]??  EKG/Telemetry   []??  Cardiology/Vascular   []??  Pathology  []??  Old records  []??  Other:  Most notable findings include: As above           Lab Results   Component Value Date     GLUCOSE 113 (H) 12/15/2021     CALCIUM 8.3 (L) 12/15/2021      (L) 12/15/2021     K 5.3 (H) 12/15/2021     CO2 23.0 12/15/2021     CL 98 12/15/2021     BUN 13 12/15/2021     CREATININE 0.66 (L) 12/15/2021     EGFRIFNONA 124 12/15/2021     BCR 19.7 12/15/2021     ANIONGAP 12.0 12/15/2021            Lab Results   Component Value Date     WBC 3.50 12/15/2021     HGB 19.0 (H) 12/15/2021     HCT 56.4 (H) 12/15/2021     MCV 98.0 (H) 12/15/2021      12/15/2021          Assessment/Plan          Active Hospital Problems:          Active Hospital Problems     Diagnosis     • **Pneumonia due to COVID-19 virus     • Hypocalcemia     • Hyponatremia     • Generalized weakness     • Primary hypertension        Plan:        Pneumonia due to COVID-19 virus with hypoxia  -COVID-19 positive  -Chest x-ray reviewed  -EKG reviewed  -AST 46  -Initial troponin negative  -Serial troponins remain normal  -Lactic acid normal.  -Blood cultures x2 no growth to  date.  -Sputum culture not significant.  -Urine for Legionella and strep pneumo, both negative.  -Ferritin elevated,  procalcitonin normal, CRP elevated at 11.44  -PT/INR, PTT ordered  -BNP normal  -Magnesium ordered  -Enhanced droplet/contact precautions  -Continuous cardiac monitoring  -Continuous pulse ox  -Decadron given in the ED, continue  -Albuterol inhaler, Tessalon, Robitussin ordered  -Remdesivir started on 12/14/2021, received 3 doses.   Elevated D-dimer; CT angiogram of the chest, negative for PE severe emphysema, pulmonary fibrosis in the perihilar and basilar lungs, borderline ascending aortic aneurysm measuring 4 cm in diameter.,  Will check venous Dopplers ultrasound bilateral lower extremity  Generalized weakness,  -Patient reports left-sided weakness greater than right, likely from myopathy from COVID-19 as per neurology, who signed off  -CT head no acute intracranial abnormality noted  -Fall precautions  -MRI brain no restricted diffusion to suggest acute or subacute infarct, no intracranial hemorrhage, neurology consulted.     Primary hypertension  -BP well-controlled  -Lipid panel follow-up with PCP     Hypocalcemia  -Calcium 8.3  -Ionized calcium 1.11, replace as per protocol.     Hyponatremia likely dilutional; will fluid restrict to 1500 mL per 24 hours  -Sodium 131--> 133 --> 135 resolved  -Serum osmolality low, urine osmolality normal, urine sodium 69   Hyperkalemia mild; recheck 5.4 received calcium gluconate 1 dose, resolved  DVT prophylaxis:  Medical DVT prophylaxis orders are present.     CODE STATUS:    Code Status (Patient has no pulse and is not breathing): CPR (Attempt to Resuscitate)  Medical Interventions (Patient has pulse or is breathing): Full Support     Admission Status:  I believe this patient meets observation status.     I discussed the patient's findings and my recommendations with patient and family.     This patient has been examined wearing appropriate Personal  Protective Equipment.   .     Your medication list      START taking these medications      Instructions Last Dose Given Next Dose Due   dexamethasone 6 MG tablet  Commonly known as: DECADRON  Start taking on: December 17, 2021      Take 1 tablet by mouth Daily for 8 doses. Indications: COVID-19 Confirmed Infection          CONTINUE taking these medications      Instructions Last Dose Given Next Dose Due   amLODIPine 5 MG tablet  Commonly known as: NORVASC      Take 5 mg by mouth Daily.       benzonatate 100 MG capsule  Commonly known as: TESSALON      Take 100 mg by mouth 3 (Three) Times a Day As Needed for Cough.       metoprolol tartrate 25 MG tablet  Commonly known as: LOPRESSOR      Take 1 tablet by mouth 2 (Two) Times a Day.       pantoprazole 40 MG EC tablet  Commonly known as: PROTONIX      Take 40 mg by mouth Daily.             Where to Get Your Medications      These medications were sent to [facility] [address] [place] [address]    Hours: Mon-Fri 7:00AM-7:00PM Phone: [phone]   · dexamethasone 6 MG tablet         DISCHARGE Follow Up Recommendations for labs and diagnostics; follow-up with PCP within a week  Discharge condition; in stable condition to home on oxygen  Discharge diet; regular diet.  Discharge activity; as tolerated.  Discharge total time; more than 33 minutes.  Time: I spent more than 33 minutes on this discharge activity which included: face-to-face encounter with the patient, reviewing the data in the system, coordination of the care with the nursing staff as well as consultants, documentation, and entering orders.     Electronically signed by Parth Light MD, 12/16/21, 2:04 PM EST.

## 2021-12-16 NOTE — THERAPY DISCHARGE NOTE
Subjective: Pt agreeable to therapeutic plan of care.    Objective:   On 2l/nc ,spO2 95%    Bed mobility - Independent  Transfers - Modified-Independent  Ambulation - 20 feet x3 Supervision   AROM ex in sitting/standing to include chest expansion ex    Pain: 0 VAS  Education: Provided education on importance of mobility and skilled verbal / tactile cueing throughout intervention.     Assessment: Kentrell Cleaning presents with functional mobility impairments which indicate the need for skilled intervention. Tolerating session today without incident. Still desats with activity on room air, dropping to 85% and only recovers to 87% upon sitting. Returned to 90% with reapplication of supplemental O2. Pt reports of fatigue after walking in room 3x, no lightheadedness, no SOA. Pt is safe to dc home, will need 6 MWT prior to dc for home O2 needs.  Will sign off.     Plan/Recommendations:   Pt would benefit from Home at discharge from facility and requires no DME at discharge.   Pt desires Home at discharge. Pt cooperative; agreeable to therapeutic recommendations and plan of care.     Basic Mobility 6-click:  Rollin = Total, A lot = 2, A little = 3; 4 = None  Supine>Sit:   1 = Total, A lot = 2, A little = 3; 4 = None   Sit>Stand with arms:  1 = Total, A lot = 2, A little = 3; 4 = None  Bed>Chair:   1 = Total, A lot = 2, A little = 3; 4 = None  Ambulate in room:  1 = Total, A lot = 2, A little = 3; 4 = None  3-5 Steps with railin = Total, A lot = 2, A little = 3; 4 = None  Score: 23    Post-Tx Position: Call light and personal items within reach. Sitting EOB  PPE: gloves, surgical mask, eyewear protection

## 2021-12-16 NOTE — PLAN OF CARE
Goal Outcome Evaluation:  Plan of Care Reviewed With: patient      Assessment: Kentrell Cleaning presents with functional mobility impairments which indicate the need for skilled intervention. Tolerating session today without incident. Still desats with activity on room air, dropping to 85% and only recovers to 87% upon sitting. Returned to 90% with reapplication of supplemental O2. Pt reports of fatigue after walking in room 3x, no lightheadedness, no SOA. Pt is safe to dc home, will need 6 MWT prior to dc for home O2 needs.  Will sign off.

## 2021-12-17 ENCOUNTER — READMISSION MANAGEMENT (OUTPATIENT)
Dept: CALL CENTER | Facility: HOSPITAL | Age: 61
End: 2021-12-17

## 2021-12-17 LAB — ALDOLASE SERPL-CCNC: 5.4 U/L (ref 3.3–10.3)

## 2021-12-17 NOTE — OUTREACH NOTE
Prep Survey      Responses   [facility] facility patient discharged from? [facility]   Is LACE score < 7 ? Yes   Emergency Room discharge w/ pulse ox? No   Eligibility Readm Mgmt   Discharge diagnosis Pneumonia due to COVID-19 virus   Does the patient have one of the following disease processes/diagnoses(primary or secondary)? COVID-19   Does the patient have Home health ordered? No   Is there a DME ordered? No   Prep survey completed? Yes          Eli Gutiérrez RN

## 2021-12-17 NOTE — OUTREACH NOTE
COVID-19 Week 1 Survey      Responses   [facility] patient discharged from? [facility]   Does the patient have one of the following disease processes/diagnoses(primary or secondary)? COVID-19   COVID-19 underlying condition? None   Call Number Call 1   Week 1 Call successful? Yes   Call start time 1120   Call end time 1132   Is patient permission given to speak with other caregiver? Yes   List who call center can speak with Dixie-s/o   Meds reviewed with patient/caregiver? Yes   Is the patient having any side effects they believe may be caused by any medication additions or changes? No   Does the patient have all medications ordered at discharge? Yes   Is the patient taking all medications as directed (includes completed medication regime)? Yes   Does the patient have a primary care provider?  Yes   Comments regarding PCP Dr Krueger at [facility] in [place].   Does the patient have an appointment with their PCP or specialist within 7 days of discharge? No   What is preventing the patient from scheduling follow up appointments within 7 days of discharge? Haven't had time   Nursing Interventions Advised patient to make appointment,  Educated patient on importance of making appointment   Has the patient kept scheduled appointments due by today? N/A   Has home health visited the patient within 72 hours of discharge? N/A   Has all DME been delivered? Yes   DME comments Using home O2 continuous at 2L.   Psychosocial issues? No   Did the patient receive a copy of their discharge instructions? Yes   Did the patient receive a copy of COVID-19 specific instructions? Yes   Nursing interventions Reviewed instructions with patient   What is the patient's perception of their health status since discharge? Improving   Does the patient have any of the following symptoms? Cough,  Loss of taste/smell   Nursing Interventions Nurse provided patient education   Pulse Ox monitoring None  [Discussed where to purchase  pulse ox.]   Is the patient/caregiver able to teach back steps to recovery at home? Set small, achievable goals for return to baseline health,  Eat a well-balance diet,  Rest and rebuild strength, gradually increase activity   If the patient is a current smoker, are they able to teach back resources for cessation? Not a smoker  [Hx of vaping-states has not vaped since discharge.]   Is the patient/caregiver able to teach back the hierarchy of who to call/visit for symptoms/problems? PCP, Specialist, Home health nurse, Urgent Care, ED, 911 Yes   COVID-19 call completed? Yes   Wrap up additional comments States is feeling better-continues with productive cough, loss of taste/smell. States appetite still good. Encouraged to discuss quarantine with PCP, replace toothbrush/paste/razor, monitor temp, do deep breathing exercises hourly while awake. Denies any needs today.          Clarice Julien RN

## 2021-12-18 ENCOUNTER — READMISSION MANAGEMENT (OUTPATIENT)
Dept: CALL CENTER | Facility: HOSPITAL | Age: 61
End: 2021-12-18

## 2021-12-18 LAB
BACTERIA SPEC RESP CULT: ABNORMAL
BACTERIA SPEC RESP CULT: ABNORMAL
GRAM STN SPEC: ABNORMAL

## 2021-12-18 NOTE — OUTREACH NOTE
COVID-19 Week 1 Survey      Responses   [facility] patient discharged from? [facility]   Does the patient have one of the following disease processes/diagnoses(primary or secondary)? COVID-19   COVID-19 underlying condition? None   Call Number Call 2   Week 1 Call successful? Yes   Call start time 1226   Call end time 1228   Discharge diagnosis Pneumonia due to COVID-19 virus   Meds reviewed with patient/caregiver? Yes   Comments regarding appointments pt states he is going to reach out to a Dr. next week to set up an apt    What is the patient's perception of their health status since discharge? Improving   Does the patient have any of the following symptoms? Cough,  Loss of taste/smell  [productive cough ]   Pulse Ox monitoring Intermittent   Pulse Ox device source Patient   O2 Sat comments 91-92% on 2L O2    O2 Sat: education provided Sat levels,  Monitoring frequency,  When to seek care   Is the patient/caregiver able to teach back the hierarchy of who to call/visit for symptoms/problems? PCP, Specialist, Home health nurse, Urgent Care, ED, 911 Yes   COVID-19 call completed? Yes          Bren Bryan RN

## 2021-12-19 ENCOUNTER — READMISSION MANAGEMENT (OUTPATIENT)
Dept: CALL CENTER | Facility: HOSPITAL | Age: 61
End: 2021-12-19

## 2021-12-19 LAB
BACTERIA SPEC AEROBE CULT: NORMAL
BACTERIA SPEC AEROBE CULT: NORMAL
QT INTERVAL: 387 MS

## 2021-12-19 NOTE — OUTREACH NOTE
COVID-19 Week 1 Survey      Responses   [facility] patient discharged from? [facility]   Does the patient have one of the following disease processes/diagnoses(primary or secondary)? COVID-19   COVID-19 underlying condition? None   Call Number Call 3   Week 1 Call successful? Yes   Call start time 1057   Call end time 1107   Discharge diagnosis Pneumonia due to COVID-19 virus   Is patient permission given to speak with other caregiver? Yes   List who call center can speak with Dixie-s/o   Person spoke with today (if not patient) and relationship patient   Meds reviewed with patient/caregiver? Yes   Does the patient have all medications ordered at discharge? Yes   Is the patient taking all medications as directed (includes completed medication regime)? Yes   Does the patient have a primary care provider?  Yes   Comments regarding PCP Patient states that his PCP has been out of town and is difficult to get in touch.    Does the patient have an appointment with their PCP or specialist within 7 days of discharge? No   What is preventing the patient from scheduling follow up appointments within 7 days of discharge? Haven't had time   Nursing Interventions Educated patient on importance of making appointment,  Advised patient to make appointment   Has the patient kept scheduled appointments due by today? N/A   Has home health visited the patient within 72 hours of discharge? N/A   DME comments Using home O2 continuous at 2L.   Psychosocial issues? No   Did the patient receive a copy of their discharge instructions? Yes   Did the patient receive a copy of COVID-19 specific instructions? Yes   Nursing interventions Reviewed instructions with patient   What is the patient's perception of their health status since discharge? Improving   Does the patient have any of the following symptoms? Cough,  Loss of taste/smell   Nursing Interventions Nurse provided patient education   Pulse Ox monitoring Intermittent   Pulse Ox device  source Patient   O2 Sat comments 91% on 2L.    O2 Sat: education provided Sat levels,  Monitoring frequency,  When to seek care   O2 Sat education comments Advised to do deep breath exercises hourly today. Advised to return to ER if O2 sats remain below 90% with deep breathing, rest and wearing home O2.    Is the patient/caregiver able to teach back steps to recovery at home? Set small, achievable goals for return to baseline health,  Rest and rebuild strength, gradually increase activity,  Eat a well-balance diet   Is the patient/caregiver able to teach back the hierarchy of who to call/visit for symptoms/problems? PCP, Specialist, Home health nurse, Urgent Care, ED, 911 Yes   COVID-19 call completed? Yes   Revoked No further contact(revokes)-requires comment   Is the patient interested in additional calls from an ambulatory ?  NOTE:  applies to high risk patients requiring additional follow-up. No   Graduated/Revoked comments Calls X 3 post ER discharge.           Yamile Ramos RN

## 2021-12-20 NOTE — PAYOR COMM NOTE
"Discharge notification for case# [id number]    ----------  THANK YOU,    EZEQUIEL Maguire, RN  Utilization Review  [facility]  Phone: [phone]  Fax: [phone]      NPI: [id number]  Tax ID: [id number]    Kentrell Cleaning (61 y.o. Male)             Date of Birth Social Security Number Address Home Phone MRN    1960  [facility] [place] [address] [phone] [id number]    Holiness Marital Status             None        Admission Date Admission Type Admitting Provider Attending Provider Department, Room/Bed    21 Emergency Parth Light MD  [facility] EMERGENCY DEPARTMENT, EDH4/4    Discharge Date Discharge Disposition Discharge Destination          2021 Home or Self Care Home             Attending Provider: (none)   Allergies: No Known Allergies    Isolation: None   Infection: COVID (confirmed) (21)   Code Status: Prior   Advance Care Planning Activity    Ht: 182.9 cm (72\")   Wt: 86.2 kg (190 lb)    Admission Cmt: None   Principal Problem: Pneumonia due to COVID-19 virus [U07.1,J12.82]                 Active Insurance as of 2021     Primary Coverage     Payor Plan Insurance Group Employer/Plan Group    formerly [facility] [employer] formerly [facility] BLUE CROSS BLUE [facility] PPO [id number]     Payor Plan Address Payor Plan Phone Number Payor Plan Fax Number Effective Dates    [address] [phone]  2021 - None Entered    [place] [address]       Subscriber Name Subscriber Birth Date Member ID       KENTRELL CLEANING 1960 [id number]                 Emergency Contacts      (Rel.) Home Phone Work Phone Mobile Phone    LEWIS NAPOLES (Significant Other) -- -- [phone]               Discharge Summary      Parth Light MD at [facility]               [facility] Medicine Services        Patient Name: Kentrell Cleaning  : 1960  MRN: [id number]  Primary Care Physician:  Provider, No Known  Date of admission: 2021   Date " of discharge; 12/16/2021     Subjective       Chief Complaint: weakness     History of Present Illness: Kentrell Cleaning is a 61 y.o. male with Past medical history of hypertension who presented to [facility] on 12/14/2021 complaining of some increasing general weakness, decreased appetite decreased smell and taste over the last 2 days after his wife had Covid last week.  Patient complains of generalized weakness, with the left arm and leg weaker than the right.  He complains of mid back pain for the past 3 days, describes as an immediate dull ache.  Patient rates back pain 9 on sliding scale 0-10.  Patient also complains of fatigue, difficulty relating, loss of appetite, and loss of taste and smell.  Patient denies chest pain, shortness of air, cough, nausea, fever, chills.     In the ED, CT head showed no acute intracranial abnormality; there is paranasal sinus opacification running involving the frontal, ethmoid and maxillary sinuses; brain MRI is more sensitive.  Chest x-ray showed elevation of the right hemidiaphragm with subtle peripheral hazy groundglass opacities and interstitial thickening in both lungs, likely atypical/viral pneumonia.  EKG showed sinus rhythm.  COVID-19 positive.  All labs unremarkable except sodium 131, calcium 8.3, AST 46.  All vital signs unremarkable.  Patient received Decadron, IV fluid bolus in the ED.  Patient admitted to hospitalist service for further evaluation and treatment.   Hospital course;  12/15/2021; patient denies any left upper or lower extremity weakness, no speech or swallow issues, hemodynamically stable, elevated D-dimer, will check venous Dopplers bilateral lower extremity, neurology has been consulted await MRI of the brain.  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  Review of Systems   Constitutional: Positive for decreased appetite and malaise/fatigue. Negative for chills and fever.   HENT: Negative.    Eyes: Negative.    Cardiovascular: Negative.  Negative for chest pain.   Respiratory: Negative.  Negative for cough.    Endocrine: Negative.    Skin: Negative.    Musculoskeletal: Positive for back pain.   Gastrointestinal: Negative.  Negative for nausea.   Genitourinary: Negative.    Neurological: Positive for weakness.   Psychiatric/Behavioral: Negative.    Allergic/Immunologic: Negative.          Personal History      Medical History   No past medical history on file.         Surgical History   No past surgical history on file.         Family History: family history is not on file. Otherwise pertinent FHx was reviewed and not pertinent to current issue.     Social History:       Home Medications:                               Prior to Admission Medications      Prescriptions Last Dose Informant Patient Reported? Taking?     metoprolol tartrate (LOPRESSOR) 25 MG tablet     No No     Take 1 tablet by mouth 2 (Two) Times a Day.                Allergies:  No Known Allergies     Objective       Vitals:   Temp:  [97.2 °F (36.2 °C)-97.6 °F (36.4 °C)] 97.6 °F (36.4 °C)  Heart Rate:  [62-73] 73  Resp:  [14-18] 14  BP: (148)/(89) 148/89  Flow (L/min):  [2] 2  Physical Exam  Vitals and nursing note reviewed.   Constitutional:       Appearance: Normal appearance.   HENT:      Head: Normocephalic.      Nose: Nose normal.      Mouth/Throat:      Pharynx: Oropharynx is clear.   Eyes:      Extraocular Movements: Extraocular movements intact.   Cardiovascular:      Rate and Rhythm: Normal rate and regular rhythm.      Pulses: Normal pulses.      Heart sounds: Normal heart sounds.   Pulmonary:      Effort: Pulmonary effort is normal.      Breath sounds: Normal breath sounds.   Abdominal:      General: Bowel sounds are normal.      Palpations: Abdomen is soft.   Musculoskeletal:          General: Normal range of motion.      Cervical back: Normal range of motion.  Bilateral upper and lower extremity strength normal  Skin:     General: Skin is warm and dry.   Neurological:      Mental Status: He is alert and oriented to person, place, and time.   Psychiatric:         Mood and Affect: Mood normal.         Behavior: Behavior normal.            Result Review    Result Review:  I have personally reviewed the results from the time of this admission to 12/14/2021 20:57 EST and agree with these findings:  [x]??  Laboratory  [x]??  Microbiology  [x]??  Radiology  [x]??  EKG/Telemetry   []??  Cardiology/Vascular   []??  Pathology  []??  Old records  []??  Other:  Most notable findings include: As above           Lab Results   Component Value Date     GLUCOSE 113 (H) 12/15/2021     CALCIUM 8.3 (L) 12/15/2021      (L) 12/15/2021     K 5.3 (H) 12/15/2021     CO2 23.0 12/15/2021     CL 98 12/15/2021     BUN 13 12/15/2021     CREATININE 0.66 (L) 12/15/2021     EGFRIFNONA 124 12/15/2021     BCR 19.7 12/15/2021     ANIONGAP 12.0 12/15/2021            Lab Results   Component Value Date     WBC 3.50 12/15/2021     HGB 19.0 (H) 12/15/2021     HCT 56.4 (H) 12/15/2021     MCV 98.0 (H) 12/15/2021      12/15/2021          Assessment/Plan          Active Hospital Problems:          Active Hospital Problems     Diagnosis     • **Pneumonia due to COVID-19 virus     • Hypocalcemia     • Hyponatremia     • Generalized weakness     • Primary hypertension        Plan:        Pneumonia due to COVID-19 virus with hypoxia  -COVID-19 positive  -Chest x-ray reviewed  -EKG reviewed  -AST 46  -Initial troponin negative  -Serial troponins remain normal  -Lactic acid normal.  -Blood cultures x2 no growth to date.  -Sputum culture not significant.  -Urine for Legionella and strep pneumo, both negative.  -Ferritin elevated,  procalcitonin normal, CRP elevated at 11.44  -PT/INR, PTT ordered  -BNP normal  -Magnesium  ordered  -Enhanced droplet/contact precautions  -Continuous cardiac monitoring  -Continuous pulse ox  -Decadron given in the ED, continue  -Albuterol inhaler, Tessalon, Robitussin ordered  -Remdesivir started on 12/14/2021, received 3 doses.   Elevated D-dimer; CT angiogram of the chest, negative for PE severe emphysema, pulmonary fibrosis in the perihilar and basilar lungs, borderline ascending aortic aneurysm measuring 4 cm in diameter.,  Will check venous Dopplers ultrasound bilateral lower extremity  Generalized weakness,  -Patient reports left-sided weakness greater than right, likely from myopathy from COVID-19 as per neurology, who signed off  -CT head no acute intracranial abnormality noted  -Fall precautions  -MRI brain no restricted diffusion to suggest acute or subacute infarct, no intracranial hemorrhage, neurology consulted.     Primary hypertension  -BP well-controlled  -Lipid panel follow-up with PCP     Hypocalcemia  -Calcium 8.3  -Ionized calcium 1.11, replace as per protocol.     Hyponatremia likely dilutional; will fluid restrict to 1500 mL per 24 hours  -Sodium 131--> 133 --> 135 resolved  -Serum osmolality low, urine osmolality normal, urine sodium 69   Hyperkalemia mild; recheck 5.4 received calcium gluconate 1 dose, resolved  DVT prophylaxis:  Medical DVT prophylaxis orders are present.     CODE STATUS:    Code Status (Patient has no pulse and is not breathing): CPR (Attempt to Resuscitate)  Medical Interventions (Patient has pulse or is breathing): Full Support     Admission Status:  I believe this patient meets observation status.     I discussed the patient's findings and my recommendations with patient and family.     This patient has been examined wearing appropriate Personal Protective Equipment.   .     Your medication list      START taking these medications      Instructions Last Dose Given Next Dose Due   dexamethasone 6 MG tablet  Commonly known as: DECADRON  Start taking on:  December 17, 2021      Take 1 tablet by mouth Daily for 8 doses. Indications: COVID-19 Confirmed Infection          CONTINUE taking these medications      Instructions Last Dose Given Next Dose Due   amLODIPine 5 MG tablet  Commonly known as: NORVASC      Take 5 mg by mouth Daily.       benzonatate 100 MG capsule  Commonly known as: TESSALON      Take 100 mg by mouth 3 (Three) Times a Day As Needed for Cough.       metoprolol tartrate 25 MG tablet  Commonly known as: LOPRESSOR      Take 1 tablet by mouth 2 (Two) Times a Day.       pantoprazole 40 MG EC tablet  Commonly known as: PROTONIX      Take 40 mg by mouth Daily.             Where to Get Your Medications      These medications were sent to [facility] [address] [place] [address]    Hours: Mon-Fri 7:00AM-7:00PM Phone: [phone]   · dexamethasone 6 MG tablet         DISCHARGE Follow Up Recommendations for labs and diagnostics; follow-up with PCP within a week  Discharge condition; in stable condition to home on oxygen  Discharge diet; regular diet.  Discharge activity; as tolerated.  Discharge total time; more than 33 minutes.  Time: I spent more than 33 minutes on this discharge activity which included: face-to-face encounter with the patient, reviewing the data in the system, coordination of the care with the nursing staff as well as consultants, documentation, and entering orders.     Electronically signed by Parth Light MD, 12/16/21, 2:04 PM EST.         Electronically signed by Parth Light MD at 12/16/21 [id number]

## 2021-12-21 ENCOUNTER — NURSE TRIAGE (OUTPATIENT)
Dept: CALL CENTER | Facility: HOSPITAL | Age: 61
End: 2021-12-21

## 2021-12-21 NOTE — TELEPHONE ENCOUNTER
"Reviewed guideline with caller, advises he be evaluated within 4 hours. Advised to go back to ED for re-evaluation of SOA with exertion. Caller agrees to follow care advice.    Reason for Disposition  • [1] MILD difficulty breathing (e.g., minimal/no SOB at rest, SOB with walking, pulse <100) AND [2] NEW-onset or WORSE than normal    Additional Information  • Negative: SEVERE difficulty breathing (e.g., struggling for each breath, speaks in single words)  • Negative: [1] Breathing stopped AND [2] hasn't returned  • Negative: Choking on something  • Negative: Bluish (or gray) lips or face now  • Negative: Difficult to awaken or acting confused (e.g., disoriented, slurred speech)  • Negative: Passed out (i.e., lost consciousness, collapsed and was not responding)  • Negative: Wheezing started suddenly after medicine, an allergic food or bee sting  • Negative: Stridor  • Negative: Slow, shallow and weak breathing  • Negative: Sounds like a life-threatening emergency to the triager  • Negative: Chest pain  • Negative: [1] Wheezing (high pitched whistling sound) AND [2] previous asthma attacks or use of asthma medicines  • Negative: [1] Difficulty breathing AND [2] only present when coughing  • Negative: [1] Difficulty breathing AND [2] only from stuffy or runny nose  • Negative: [1] Difficulty breathing AND [2] within 14 days of COVID-19 Exposure  • Negative: [1] MODERATE difficulty breathing (e.g., speaks in phrases, SOB even at rest, pulse 100-120) AND [2] NEW-onset or WORSE than normal  • Negative: Wheezing can be heard across the room  • Negative: Drooling or spitting out saliva (because can't swallow)  • Negative: History of prior \"blood clot\" in leg or lungs (i.e., deep vein thrombosis, pulmonary embolism)  • Negative: History of inherited increased risk of blood clots (e.g., Factor 5 Leiden, Anti-thrombin 3, Protein C or Protein S deficiency, Prothrombin mutation)  • Negative: Major surgery in the past month  • " "Negative: Hip or leg fracture (broken bone) in past month (or had cast on leg or ankle in past month)  • Negative: Illness requiring prolonged bedrest in past month (e.g., immobilization, long hospital stay)  • Negative: Long-distance travel in past month (e.g., car, bus, train, plane; with trip lasting 6 or more hours)  • Negative: Cancer treatment in past six months (or has cancer now)  • Negative: Extra heart beats OR irregular heart beating   (i.e., \"palpitations\")  • Negative: Fever > 103 F (39.4 C)  • Negative: [1] Fever > 101 F (38.3 C) AND [2] age > 60 years  • Negative: [1] Fever > 100.0 F (37.8 C) AND [2] bedridden (e.g., nursing home patient, CVA, chronic illness, recovering from surgery)  • Negative: [1] Fever > 100.0 F (37.8 C) AND [2] diabetes mellitus or weak immune system (e.g., HIV positive, cancer chemo, splenectomy, organ transplant, chronic steroids)  • Negative: [1] Periods where breathing stops and then resumes normally AND [2] bedridden (e.g., nursing home patient, CVA)  • Negative: Pregnant or postpartum (from 0 to 6 weeks after delivery)  • Negative: Patient sounds very sick or weak to the triager    Answer Assessment - Initial Assessment Questions  1. RESPIRATORY STATUS: \"Describe your breathing?\" (e.g., wheezing, shortness of breath, unable to speak, severe coughing)       SOA with exertion   2. ONSET: \"When did this breathing problem begin?\"       About 2 days ago it got worse  3. PATTERN \"Does the difficult breathing come and go, or has it been constant since it started?\"       Comes and goes, better with O2 on  4. SEVERITY: \"How bad is your breathing?\" (e.g., mild, moderate, severe)     - MILD: No SOB at rest, mild SOB with walking, speaks normally in sentences, can lay down, no retractions, pulse < 100.     - MODERATE: SOB at rest, SOB with minimal exertion and prefers to sit, cannot lie down flat, speaks in phrases, mild retractions, audible wheezing, pulse 100-120.     - SEVERE: Very " "SOB at rest, speaks in single words, struggling to breathe, sitting hunched forward, retractions, pulse > 120       mild  5. RECURRENT SYMPTOM: \"Have you had difficulty breathing before?\" If Yes, ask: \"When was the last time?\" and \"What happened that time?\"       Yes, was hospitalized for COVID pneumonia  6. CARDIAC HISTORY: \"Do you have any history of heart disease?\" (e.g., heart attack, angina, bypass surgery, angioplasty)       No, HTN  7. LUNG HISTORY: \"Do you have any history of lung disease?\"  (e.g., pulmonary embolus, asthma, emphysema)      No   8. CAUSE: \"What do you think is causing the breathing problem?\"       COVID pneumonia  9. OTHER SYMPTOMS: \"Do you have any other symptoms? (e.g., dizziness, runny nose, cough, chest pain, fever)      no  10. PREGNANCY: \"Is there any chance you are pregnant?\" \"When was your last menstrual period?\"        na  11. TRAVEL: \"Have you traveled out of the country in the last month?\" (e.g., travel history, exposures)        no    Protocols used: BREATHING DIFFICULTY-ADULT-AH      "

## 2021-12-29 ENCOUNTER — HOSPITAL ENCOUNTER (OUTPATIENT)
Dept: GENERAL RADIOLOGY | Facility: HOSPITAL | Age: 61
Discharge: HOME | End: 2021-12-29
Admitting: NURSE PRACTITIONER

## 2021-12-29 ENCOUNTER — TRANSCRIBE ORDERS (OUTPATIENT)
Dept: ADMINISTRATIVE | Facility: HOSPITAL | Age: 61
End: 2021-12-29

## 2021-12-29 DIAGNOSIS — U07.1 CLINICAL DIAGNOSIS OF COVID-19: ICD-10-CM

## 2021-12-29 DIAGNOSIS — U07.1 CLINICAL DIAGNOSIS OF COVID-19: Primary | ICD-10-CM

## 2021-12-29 PROCEDURE — 71046 X-RAY EXAM CHEST 2 VIEWS: CPT

## 2022-02-01 ENCOUNTER — TRANSCRIBE ORDERS (OUTPATIENT)
Dept: ADMINISTRATIVE | Facility: HOSPITAL | Age: 62
End: 2022-02-01

## 2022-02-01 ENCOUNTER — HOSPITAL ENCOUNTER (OUTPATIENT)
Dept: GENERAL RADIOLOGY | Facility: HOSPITAL | Age: 62
Discharge: HOME | End: 2022-02-01
Admitting: NURSE PRACTITIONER

## 2022-02-01 DIAGNOSIS — U07.1 CLINICAL DIAGNOSIS OF SEVERE ACUTE RESPIRATORY SYNDROME CORONAVIRUS 2 (SARS-COV-2) DISEASE: Primary | ICD-10-CM

## 2022-02-01 DIAGNOSIS — U07.1 CLINICAL DIAGNOSIS OF SEVERE ACUTE RESPIRATORY SYNDROME CORONAVIRUS 2 (SARS-COV-2) DISEASE: ICD-10-CM

## 2022-02-01 PROCEDURE — 71046 X-RAY EXAM CHEST 2 VIEWS: CPT

## 2022-06-02 ENCOUNTER — ON CAMPUS - OUTPATIENT (OUTPATIENT)
Dept: URBAN - METROPOLITAN AREA HOSPITAL 77 | Facility: HOSPITAL | Age: 62
End: 2022-06-02
Payer: COMMERCIAL

## 2022-06-02 DIAGNOSIS — D12.2 BENIGN NEOPLASM OF ASCENDING COLON: ICD-10-CM

## 2022-06-02 DIAGNOSIS — R19.5 OTHER FECAL ABNORMALITIES: ICD-10-CM

## 2022-06-02 DIAGNOSIS — D12.4 BENIGN NEOPLASM OF DESCENDING COLON: ICD-10-CM

## 2022-06-02 DIAGNOSIS — K57.30 DIVERTICULOSIS OF LARGE INTESTINE WITHOUT PERFORATION OR ABS: ICD-10-CM

## 2022-06-02 PROCEDURE — 45385 COLONOSCOPY W/LESION REMOVAL: CPT | Mod: 33 | Performed by: INTERNAL MEDICINE

## 2025-08-23 ENCOUNTER — APPOINTMENT (OUTPATIENT)
Dept: CT IMAGING | Facility: HOSPITAL | Age: 65
End: 2025-08-23
Payer: COMMERCIAL

## 2025-08-23 ENCOUNTER — APPOINTMENT (OUTPATIENT)
Dept: GENERAL RADIOLOGY | Facility: HOSPITAL | Age: 65
End: 2025-08-23
Payer: COMMERCIAL

## 2025-08-23 ENCOUNTER — HOSPITAL ENCOUNTER (OUTPATIENT)
Facility: HOSPITAL | Age: 65
Discharge: HOME | End: 2025-08-26
Attending: EMERGENCY MEDICINE | Admitting: FAMILY MEDICINE
Payer: COMMERCIAL

## 2025-08-23 DIAGNOSIS — I50.21 ACUTE HFREF (HEART FAILURE WITH REDUCED EJECTION FRACTION): Primary | ICD-10-CM

## 2025-08-23 DIAGNOSIS — I50.43 ACUTE ON CHRONIC COMBINED SYSTOLIC AND DIASTOLIC CONGESTIVE HEART FAILURE: ICD-10-CM

## 2025-08-23 PROBLEM — I50.9 ACUTE CONGESTIVE HEART FAILURE: Status: ACTIVE | Noted: 2025-08-23

## 2025-08-23 PROBLEM — I50.9 CHF EXACERBATION: Status: ACTIVE | Noted: 2025-08-23

## 2025-08-23 LAB
ANION GAP SERPL CALCULATED.3IONS-SCNC: 13 MMOL/L (ref 5–15)
BASOPHILS # BLD AUTO: 0.04 10*3/MM3 (ref 0–0.2)
BASOPHILS NFR BLD AUTO: 0.9 % (ref 0–1.5)
BUN SERPL-MCNC: 10.3 MG/DL (ref 8–23)
BUN/CREAT SERPL: 9.9 (ref 7–25)
CALCIUM SPEC-SCNC: 8.6 MG/DL (ref 8.6–10.5)
CHLORIDE SERPL-SCNC: 102 MMOL/L (ref 98–107)
CO2 SERPL-SCNC: 25 MMOL/L (ref 22–29)
CREAT SERPL-MCNC: 1.04 MG/DL (ref 0.76–1.27)
D DIMER PPP FEU-MCNC: 0.97 MCGFEU/ML (ref 0–0.63)
DEPRECATED RDW RBC AUTO: 56 FL (ref 37–54)
EGFRCR SERPLBLD CKD-EPI 2021: 80.7 ML/MIN/1.73
EOSINOPHIL # BLD AUTO: 0.09 10*3/MM3 (ref 0–0.4)
EOSINOPHIL NFR BLD AUTO: 2.1 % (ref 0.3–6.2)
ERYTHROCYTE [DISTWIDTH] IN BLOOD BY AUTOMATED COUNT: 14.9 % (ref 12.3–15.4)
GEN 5 1HR TROPONIN T REFLEX: 40 NG/L
GLUCOSE SERPL-MCNC: 111 MG/DL (ref 65–99)
HCT VFR BLD AUTO: 57.3 % (ref 37.5–51)
HGB BLD-MCNC: 18.9 G/DL (ref 13–17.7)
IMM GRANULOCYTES # BLD AUTO: 0.01 10*3/MM3 (ref 0–0.05)
IMM GRANULOCYTES NFR BLD AUTO: 0.2 % (ref 0–0.5)
LYMPHOCYTES # BLD AUTO: 1.68 10*3/MM3 (ref 0.7–3.1)
LYMPHOCYTES NFR BLD AUTO: 39.8 % (ref 19.6–45.3)
MCH RBC QN AUTO: 33 PG (ref 26.6–33)
MCHC RBC AUTO-ENTMCNC: 33 G/DL (ref 31.5–35.7)
MCV RBC AUTO: 100 FL (ref 79–97)
MONOCYTES # BLD AUTO: 0.47 10*3/MM3 (ref 0.1–0.9)
MONOCYTES NFR BLD AUTO: 11.1 % (ref 5–12)
NEUTROPHILS NFR BLD AUTO: 1.93 10*3/MM3 (ref 1.7–7)
NEUTROPHILS NFR BLD AUTO: 45.9 % (ref 42.7–76)
NT-PROBNP SERPL-MCNC: 2112 PG/ML (ref 0–900)
PLATELET # BLD AUTO: 171 10*3/MM3 (ref 140–450)
PMV BLD AUTO: 10.8 FL (ref 6–12)
POTASSIUM SERPL-SCNC: 4.4 MMOL/L (ref 3.5–5.2)
RBC # BLD AUTO: 5.73 10*6/MM3 (ref 4.14–5.8)
SODIUM SERPL-SCNC: 140 MMOL/L (ref 136–145)
TROPONIN T % DELTA: -7
TROPONIN T NUMERIC DELTA: -3 NG/L
TROPONIN T SERPL HS-MCNC: 43 NG/L
WBC NRBC COR # BLD AUTO: 4.22 10*3/MM3 (ref 3.4–10.8)

## 2025-08-23 PROCEDURE — 99285 EMERGENCY DEPT VISIT HI MDM: CPT | Performed by: EMERGENCY MEDICINE

## 2025-08-23 PROCEDURE — 80048 BASIC METABOLIC PNL TOTAL CA: CPT | Performed by: EMERGENCY MEDICINE

## 2025-08-23 PROCEDURE — 71046 X-RAY EXAM CHEST 2 VIEWS: CPT

## 2025-08-23 PROCEDURE — 85379 FIBRIN DEGRADATION QUANT: CPT | Performed by: EMERGENCY MEDICINE

## 2025-08-23 PROCEDURE — 36415 COLL VENOUS BLD VENIPUNCTURE: CPT

## 2025-08-23 PROCEDURE — 84484 ASSAY OF TROPONIN QUANT: CPT | Performed by: EMERGENCY MEDICINE

## 2025-08-23 PROCEDURE — G0378 HOSPITAL OBSERVATION PER HR: HCPCS

## 2025-08-23 PROCEDURE — 25510000001 IOPAMIDOL PER 1 ML: Performed by: EMERGENCY MEDICINE

## 2025-08-23 PROCEDURE — 83880 ASSAY OF NATRIURETIC PEPTIDE: CPT | Performed by: EMERGENCY MEDICINE

## 2025-08-23 PROCEDURE — 96375 TX/PRO/DX INJ NEW DRUG ADDON: CPT

## 2025-08-23 PROCEDURE — 94799 UNLISTED PULMONARY SVC/PX: CPT

## 2025-08-23 PROCEDURE — 93005 ELECTROCARDIOGRAM TRACING: CPT | Performed by: EMERGENCY MEDICINE

## 2025-08-23 PROCEDURE — 71275 CT ANGIOGRAPHY CHEST: CPT

## 2025-08-23 PROCEDURE — 85025 COMPLETE CBC W/AUTO DIFF WBC: CPT | Performed by: EMERGENCY MEDICINE

## 2025-08-23 PROCEDURE — 25010000002 FUROSEMIDE PER 20 MG: Performed by: EMERGENCY MEDICINE

## 2025-08-23 RX ORDER — IOPAMIDOL 755 MG/ML
100 INJECTION, SOLUTION INTRAVASCULAR
Status: COMPLETED | OUTPATIENT
Start: 2025-08-23 | End: 2025-08-23

## 2025-08-23 RX ORDER — BUDESONIDE 0.5 MG/2ML
0.5 INHALANT ORAL
Status: DISCONTINUED | OUTPATIENT
Start: 2025-08-24 | End: 2025-08-26 | Stop reason: HOSPADM

## 2025-08-23 RX ORDER — ENOXAPARIN SODIUM 100 MG/ML
40 INJECTION SUBCUTANEOUS DAILY
Status: DISCONTINUED | OUTPATIENT
Start: 2025-08-24 | End: 2025-08-24

## 2025-08-23 RX ORDER — SODIUM CHLORIDE 0.9 % (FLUSH) 0.9 %
10 SYRINGE (ML) INJECTION AS NEEDED
Status: DISCONTINUED | OUTPATIENT
Start: 2025-08-23 | End: 2025-08-26 | Stop reason: HOSPADM

## 2025-08-23 RX ORDER — POLYETHYLENE GLYCOL 3350 17 G/17G
17 POWDER, FOR SOLUTION ORAL DAILY PRN
Status: DISCONTINUED | OUTPATIENT
Start: 2025-08-23 | End: 2025-08-26 | Stop reason: HOSPADM

## 2025-08-23 RX ORDER — BENZONATATE 100 MG/1
100 CAPSULE ORAL 3 TIMES DAILY PRN
Status: DISCONTINUED | OUTPATIENT
Start: 2025-08-23 | End: 2025-08-26 | Stop reason: HOSPADM

## 2025-08-23 RX ORDER — BISACODYL 5 MG/1
5 TABLET, DELAYED RELEASE ORAL DAILY PRN
Status: DISCONTINUED | OUTPATIENT
Start: 2025-08-23 | End: 2025-08-26 | Stop reason: HOSPADM

## 2025-08-23 RX ORDER — IPRATROPIUM BROMIDE AND ALBUTEROL SULFATE 2.5; .5 MG/3ML; MG/3ML
3 SOLUTION RESPIRATORY (INHALATION) EVERY 6 HOURS PRN
Status: DISCONTINUED | OUTPATIENT
Start: 2025-08-23 | End: 2025-08-25 | Stop reason: SDUPTHER

## 2025-08-23 RX ORDER — NITROGLYCERIN 0.4 MG/1
0.4 TABLET SUBLINGUAL
Status: DISCONTINUED | OUTPATIENT
Start: 2025-08-23 | End: 2025-08-25

## 2025-08-23 RX ORDER — SODIUM CHLORIDE 9 MG/ML
40 INJECTION, SOLUTION INTRAVENOUS AS NEEDED
Status: DISCONTINUED | OUTPATIENT
Start: 2025-08-23 | End: 2025-08-26 | Stop reason: HOSPADM

## 2025-08-23 RX ORDER — PANTOPRAZOLE SODIUM 40 MG/1
40 TABLET, DELAYED RELEASE ORAL DAILY
Status: DISCONTINUED | OUTPATIENT
Start: 2025-08-24 | End: 2025-08-26 | Stop reason: HOSPADM

## 2025-08-23 RX ORDER — ASPIRIN 81 MG/1
81 TABLET, CHEWABLE ORAL DAILY
Status: DISCONTINUED | OUTPATIENT
Start: 2025-08-24 | End: 2025-08-25

## 2025-08-23 RX ORDER — AMOXICILLIN 250 MG
2 CAPSULE ORAL 2 TIMES DAILY PRN
Status: DISCONTINUED | OUTPATIENT
Start: 2025-08-23 | End: 2025-08-26 | Stop reason: HOSPADM

## 2025-08-23 RX ORDER — IPRATROPIUM BROMIDE AND ALBUTEROL SULFATE 2.5; .5 MG/3ML; MG/3ML
3 SOLUTION RESPIRATORY (INHALATION)
Status: DISCONTINUED | OUTPATIENT
Start: 2025-08-23 | End: 2025-08-25

## 2025-08-23 RX ORDER — METOPROLOL TARTRATE 25 MG/1
25 TABLET, FILM COATED ORAL 2 TIMES DAILY
Status: DISCONTINUED | OUTPATIENT
Start: 2025-08-23 | End: 2025-08-24

## 2025-08-23 RX ORDER — BISACODYL 10 MG
10 SUPPOSITORY, RECTAL RECTAL DAILY PRN
Status: DISCONTINUED | OUTPATIENT
Start: 2025-08-23 | End: 2025-08-26 | Stop reason: HOSPADM

## 2025-08-23 RX ORDER — SODIUM CHLORIDE 0.9 % (FLUSH) 0.9 %
10 SYRINGE (ML) INJECTION EVERY 12 HOURS SCHEDULED
Status: DISCONTINUED | OUTPATIENT
Start: 2025-08-23 | End: 2025-08-26 | Stop reason: HOSPADM

## 2025-08-23 RX ORDER — FUROSEMIDE 10 MG/ML
80 INJECTION INTRAMUSCULAR; INTRAVENOUS ONCE
Status: COMPLETED | OUTPATIENT
Start: 2025-08-23 | End: 2025-08-23

## 2025-08-23 RX ORDER — IPRATROPIUM BROMIDE AND ALBUTEROL SULFATE 2.5; .5 MG/3ML; MG/3ML
3 SOLUTION RESPIRATORY (INHALATION) ONCE
Status: COMPLETED | OUTPATIENT
Start: 2025-08-23 | End: 2025-08-23

## 2025-08-23 RX ADMIN — METOPROLOL TARTRATE 25 MG: 25 TABLET, FILM COATED ORAL at 22:05

## 2025-08-23 RX ADMIN — Medication 10 ML: at 22:06

## 2025-08-23 RX ADMIN — IPRATROPIUM BROMIDE AND ALBUTEROL SULFATE 3 ML: .5; 3 SOLUTION RESPIRATORY (INHALATION) at 12:51

## 2025-08-23 RX ADMIN — IOPAMIDOL 100 ML: 755 INJECTION, SOLUTION INTRAVENOUS at 14:28

## 2025-08-23 RX ADMIN — FUROSEMIDE 80 MG: 10 INJECTION, SOLUTION INTRAMUSCULAR; INTRAVENOUS at 15:23

## 2025-08-24 ENCOUNTER — APPOINTMENT (OUTPATIENT)
Dept: CARDIOLOGY | Facility: HOSPITAL | Age: 65
End: 2025-08-24
Payer: COMMERCIAL

## 2025-08-24 LAB
ALBUMIN SERPL-MCNC: 4 G/DL (ref 3.5–5.2)
ALBUMIN/GLOB SERPL: 1.4 G/DL
ALP SERPL-CCNC: 114 U/L (ref 39–117)
ALT SERPL W P-5'-P-CCNC: 51 U/L (ref 1–41)
ANION GAP SERPL CALCULATED.3IONS-SCNC: 12.7 MMOL/L (ref 5–15)
ANION GAP SERPL CALCULATED.3IONS-SCNC: 17.5 MMOL/L (ref 5–15)
AORTIC DIMENSIONLESS INDEX: 0.56 (DI)
AST SERPL-CCNC: 29 U/L (ref 1–40)
AV MEAN PRESS GRAD SYS DOP V1V2: 1 MMHG
AV VMAX SYS DOP: 80.2 CM/SEC
BASOPHILS # BLD AUTO: 0.06 10*3/MM3 (ref 0–0.2)
BASOPHILS # BLD AUTO: 0.07 10*3/MM3 (ref 0–0.2)
BASOPHILS NFR BLD AUTO: 0.9 % (ref 0–1.5)
BASOPHILS NFR BLD AUTO: 0.9 % (ref 0–1.5)
BH CV ECHO LEFT VENTRICLE GLOBAL LONGITUDINAL STRAIN: -3.4 %
BH CV ECHO MEAS - AO MAX PG: 2.6 MMHG
BH CV ECHO MEAS - AO V2 VTI: 19.9 CM
BH CV ECHO MEAS - AVA(I,D): 1.93 CM2
BH CV ECHO MEAS - EDV(CUBED): 216 ML
BH CV ECHO MEAS - EDV(MOD-SP2): 240 ML
BH CV ECHO MEAS - EDV(MOD-SP4): 212 ML
BH CV ECHO MEAS - EF(MOD-SP2): 29.2 %
BH CV ECHO MEAS - EF(MOD-SP4): 28.3 %
BH CV ECHO MEAS - ESV(CUBED): 125 ML
BH CV ECHO MEAS - ESV(MOD-SP2): 170 ML
BH CV ECHO MEAS - ESV(MOD-SP4): 152 ML
BH CV ECHO MEAS - FS: 16.7 %
BH CV ECHO MEAS - IVS/LVPW: 0.75 CM
BH CV ECHO MEAS - IVSD: 0.9 CM
BH CV ECHO MEAS - LA DIMENSION: 4.5 CM
BH CV ECHO MEAS - LV DIASTOLIC VOL/BSA (35-75): 104.7 CM2
BH CV ECHO MEAS - LV MASS(C)D: 263 GRAMS
BH CV ECHO MEAS - LV MAX PG: 1.77 MMHG
BH CV ECHO MEAS - LV MEAN PG: 1 MMHG
BH CV ECHO MEAS - LV SYSTOLIC VOL/BSA (12-30): 75.1 CM2
BH CV ECHO MEAS - LV V1 MAX: 66.5 CM/SEC
BH CV ECHO MEAS - LV V1 VTI: 11.1 CM
BH CV ECHO MEAS - LVIDD: 6 CM
BH CV ECHO MEAS - LVIDS: 5 CM
BH CV ECHO MEAS - LVOT AREA: 3.5 CM2
BH CV ECHO MEAS - LVOT DIAM: 2.1 CM
BH CV ECHO MEAS - LVPWD: 1.2 CM
BH CV ECHO MEAS - MR MAX PG: 34.6 MMHG
BH CV ECHO MEAS - MR MAX VEL: 294 CM/SEC
BH CV ECHO MEAS - MV A DUR: 0.14 SEC
BH CV ECHO MEAS - MV A MAX VEL: 32.7 CM/SEC
BH CV ECHO MEAS - MV DEC SLOPE: 580 CM/SEC2
BH CV ECHO MEAS - MV DEC TIME: 0.11 SEC
BH CV ECHO MEAS - MV E MAX VEL: 78.3 CM/SEC
BH CV ECHO MEAS - MV E/A: 2.39
BH CV ECHO MEAS - MV MAX PG: 3.3 MMHG
BH CV ECHO MEAS - MV MEAN PG: 1 MMHG
BH CV ECHO MEAS - MV P1/2T: 45.7 MSEC
BH CV ECHO MEAS - MV V2 VTI: 17 CM
BH CV ECHO MEAS - MVA(P1/2T): 4.8 CM2
BH CV ECHO MEAS - MVA(VTI): 2.26 CM2
BH CV ECHO MEAS - PA ACC TIME: 0.09 SEC
BH CV ECHO MEAS - PA V2 MAX: 64.2 CM/SEC
BH CV ECHO MEAS - RAP SYSTOLE: 3 MMHG
BH CV ECHO MEAS - RV MAX PG: 0.52 MMHG
BH CV ECHO MEAS - RV V1 MAX: 35.9 CM/SEC
BH CV ECHO MEAS - RV V1 VTI: 6 CM
BH CV ECHO MEAS - SV(LVOT): 38.4 ML
BH CV ECHO MEAS - SV(MOD-SP2): 70 ML
BH CV ECHO MEAS - SV(MOD-SP4): 60 ML
BH CV ECHO MEAS - SVI(LVOT): 19 ML/M2
BH CV ECHO MEAS - SVI(MOD-SP2): 34.6 ML/M2
BH CV ECHO MEAS - SVI(MOD-SP4): 29.6 ML/M2
BH CV ECHO MEAS - TAPSE (>1.6): 1.36 CM
BH CV XLRA - TDI S': 10.2 CM/SEC
BILIRUB SERPL-MCNC: 1.3 MG/DL (ref 0–1.2)
BUN SERPL-MCNC: 14.6 MG/DL (ref 8–23)
BUN SERPL-MCNC: 18.3 MG/DL (ref 8–23)
BUN/CREAT SERPL: 13.4 (ref 7–25)
BUN/CREAT SERPL: 15.3 (ref 7–25)
CALCIUM SPEC-SCNC: 8.9 MG/DL (ref 8.6–10.5)
CALCIUM SPEC-SCNC: 9.3 MG/DL (ref 8.6–10.5)
CHLORIDE SERPL-SCNC: 100 MMOL/L (ref 98–107)
CHLORIDE SERPL-SCNC: 103 MMOL/L (ref 98–107)
CHOLEST SERPL-MCNC: 161 MG/DL (ref 0–200)
CO2 SERPL-SCNC: 21.5 MMOL/L (ref 22–29)
CO2 SERPL-SCNC: 22.3 MMOL/L (ref 22–29)
CREAT SERPL-MCNC: 1.09 MG/DL (ref 0.76–1.27)
CREAT SERPL-MCNC: 1.2 MG/DL (ref 0.76–1.27)
DEPRECATED RDW RBC AUTO: 52.8 FL (ref 37–54)
DEPRECATED RDW RBC AUTO: 54 FL (ref 37–54)
EGFRCR SERPLBLD CKD-EPI 2021: 68 ML/MIN/1.73
EGFRCR SERPLBLD CKD-EPI 2021: 76.3 ML/MIN/1.73
EOSINOPHIL # BLD AUTO: 0.09 10*3/MM3 (ref 0–0.4)
EOSINOPHIL # BLD AUTO: 0.17 10*3/MM3 (ref 0–0.4)
EOSINOPHIL NFR BLD AUTO: 1.1 % (ref 0.3–6.2)
EOSINOPHIL NFR BLD AUTO: 2.6 % (ref 0.3–6.2)
ERYTHROCYTE [DISTWIDTH] IN BLOOD BY AUTOMATED COUNT: 14.4 % (ref 12.3–15.4)
ERYTHROCYTE [DISTWIDTH] IN BLOOD BY AUTOMATED COUNT: 14.6 % (ref 12.3–15.4)
GLOBULIN UR ELPH-MCNC: 2.8 GM/DL
GLUCOSE SERPL-MCNC: 116 MG/DL (ref 65–99)
GLUCOSE SERPL-MCNC: 94 MG/DL (ref 65–99)
HBA1C MFR BLD: 5.64 % (ref 4.8–5.6)
HCT VFR BLD AUTO: 58.2 % (ref 37.5–51)
HCT VFR BLD AUTO: 58.3 % (ref 37.5–51)
HDLC SERPL-MCNC: 55 MG/DL (ref 40–60)
HGB BLD-MCNC: 19.1 G/DL (ref 13–17.7)
HGB BLD-MCNC: 19.4 G/DL (ref 13–17.7)
IMM GRANULOCYTES # BLD AUTO: 0.01 10*3/MM3 (ref 0–0.05)
IMM GRANULOCYTES # BLD AUTO: 0.02 10*3/MM3 (ref 0–0.05)
IMM GRANULOCYTES NFR BLD AUTO: 0.2 % (ref 0–0.5)
IMM GRANULOCYTES NFR BLD AUTO: 0.2 % (ref 0–0.5)
LDLC SERPL CALC-MCNC: 85 MG/DL (ref 0–100)
LDLC/HDLC SERPL: 1.49 {RATIO}
LEFT ATRIUM VOLUME INDEX: 37.3 ML/M2
LV EF BIPLANE MOD: 28.8 %
LYMPHOCYTES # BLD AUTO: 1.77 10*3/MM3 (ref 0.7–3.1)
LYMPHOCYTES # BLD AUTO: 1.79 10*3/MM3 (ref 0.7–3.1)
LYMPHOCYTES NFR BLD AUTO: 21.5 % (ref 19.6–45.3)
LYMPHOCYTES NFR BLD AUTO: 27.7 % (ref 19.6–45.3)
MAGNESIUM SERPL-MCNC: 2.1 MG/DL (ref 1.6–2.4)
MAGNESIUM SERPL-MCNC: 2.2 MG/DL (ref 1.6–2.4)
MCH RBC QN AUTO: 32.6 PG (ref 26.6–33)
MCH RBC QN AUTO: 32.6 PG (ref 26.6–33)
MCHC RBC AUTO-ENTMCNC: 32.8 G/DL (ref 31.5–35.7)
MCHC RBC AUTO-ENTMCNC: 33.3 G/DL (ref 31.5–35.7)
MCV RBC AUTO: 97.7 FL (ref 79–97)
MCV RBC AUTO: 99.7 FL (ref 79–97)
MONOCYTES # BLD AUTO: 0.69 10*3/MM3 (ref 0.1–0.9)
MONOCYTES # BLD AUTO: 0.84 10*3/MM3 (ref 0.1–0.9)
MONOCYTES NFR BLD AUTO: 10.2 % (ref 5–12)
MONOCYTES NFR BLD AUTO: 10.7 % (ref 5–12)
NEUTROPHILS NFR BLD AUTO: 3.74 10*3/MM3 (ref 1.7–7)
NEUTROPHILS NFR BLD AUTO: 5.44 10*3/MM3 (ref 1.7–7)
NEUTROPHILS NFR BLD AUTO: 57.9 % (ref 42.7–76)
NEUTROPHILS NFR BLD AUTO: 66.1 % (ref 42.7–76)
NRBC BLD AUTO-RTO: 0 /100 WBC (ref 0–0.2)
NRBC BLD AUTO-RTO: 0 /100 WBC (ref 0–0.2)
PHOSPHATE SERPL-MCNC: 4.1 MG/DL (ref 2.5–4.5)
PLATELET # BLD AUTO: 146 10*3/MM3 (ref 140–450)
PLATELET # BLD AUTO: 194 10*3/MM3 (ref 140–450)
PMV BLD AUTO: 11.1 FL (ref 6–12)
PMV BLD AUTO: 11.4 FL (ref 6–12)
POTASSIUM SERPL-SCNC: 3.6 MMOL/L (ref 3.5–5.2)
POTASSIUM SERPL-SCNC: 4 MMOL/L (ref 3.5–5.2)
POTASSIUM SERPL-SCNC: 4.4 MMOL/L (ref 3.5–5.2)
PROT SERPL-MCNC: 6.8 G/DL (ref 6–8.5)
QT INTERVAL: 367 MS
QTC INTERVAL: 479 MS
RBC # BLD AUTO: 5.85 10*6/MM3 (ref 4.14–5.8)
RBC # BLD AUTO: 5.96 10*6/MM3 (ref 4.14–5.8)
SINUS: 3.8 CM
SODIUM SERPL-SCNC: 138 MMOL/L (ref 136–145)
SODIUM SERPL-SCNC: 139 MMOL/L (ref 136–145)
STJ: 2.6 CM
T4 FREE SERPL-MCNC: 1.22 NG/DL (ref 0.92–1.68)
TRIGL SERPL-MCNC: 119 MG/DL (ref 0–150)
TSH SERPL DL<=0.05 MIU/L-ACNC: 3.92 UIU/ML (ref 0.27–4.2)
VLDLC SERPL-MCNC: 21 MG/DL (ref 5–40)
WBC NRBC COR # BLD AUTO: 6.46 10*3/MM3 (ref 3.4–10.8)
WBC NRBC COR # BLD AUTO: 8.23 10*3/MM3 (ref 3.4–10.8)

## 2025-08-24 PROCEDURE — 83735 ASSAY OF MAGNESIUM: CPT | Performed by: STUDENT IN AN ORGANIZED HEALTH CARE EDUCATION/TRAINING PROGRAM

## 2025-08-24 PROCEDURE — 80061 LIPID PANEL: CPT | Performed by: STUDENT IN AN ORGANIZED HEALTH CARE EDUCATION/TRAINING PROGRAM

## 2025-08-24 PROCEDURE — 80050 GENERAL HEALTH PANEL: CPT | Performed by: STUDENT IN AN ORGANIZED HEALTH CARE EDUCATION/TRAINING PROGRAM

## 2025-08-24 PROCEDURE — 93306 TTE W/DOPPLER COMPLETE: CPT

## 2025-08-24 PROCEDURE — 99204 OFFICE O/P NEW MOD 45 MIN: CPT | Performed by: STUDENT IN AN ORGANIZED HEALTH CARE EDUCATION/TRAINING PROGRAM

## 2025-08-24 PROCEDURE — 93356 MYOCRD STRAIN IMG SPCKL TRCK: CPT | Performed by: STUDENT IN AN ORGANIZED HEALTH CARE EDUCATION/TRAINING PROGRAM

## 2025-08-24 PROCEDURE — G0378 HOSPITAL OBSERVATION PER HR: HCPCS

## 2025-08-24 PROCEDURE — 25010000002 FUROSEMIDE PER 20 MG: Performed by: INTERNAL MEDICINE

## 2025-08-24 PROCEDURE — 25010000002 FUROSEMIDE PER 20 MG: Performed by: STUDENT IN AN ORGANIZED HEALTH CARE EDUCATION/TRAINING PROGRAM

## 2025-08-24 PROCEDURE — 84100 ASSAY OF PHOSPHORUS: CPT | Performed by: STUDENT IN AN ORGANIZED HEALTH CARE EDUCATION/TRAINING PROGRAM

## 2025-08-24 PROCEDURE — 96372 THER/PROPH/DIAG INJ SC/IM: CPT

## 2025-08-24 PROCEDURE — 83036 HEMOGLOBIN GLYCOSYLATED A1C: CPT | Performed by: STUDENT IN AN ORGANIZED HEALTH CARE EDUCATION/TRAINING PROGRAM

## 2025-08-24 PROCEDURE — 84132 ASSAY OF SERUM POTASSIUM: CPT | Performed by: INTERNAL MEDICINE

## 2025-08-24 PROCEDURE — 93005 ELECTROCARDIOGRAM TRACING: CPT | Performed by: INTERNAL MEDICINE

## 2025-08-24 PROCEDURE — 84439 ASSAY OF FREE THYROXINE: CPT | Performed by: STUDENT IN AN ORGANIZED HEALTH CARE EDUCATION/TRAINING PROGRAM

## 2025-08-24 PROCEDURE — 83735 ASSAY OF MAGNESIUM: CPT | Performed by: NURSE PRACTITIONER

## 2025-08-24 PROCEDURE — 80048 BASIC METABOLIC PNL TOTAL CA: CPT | Performed by: INTERNAL MEDICINE

## 2025-08-24 PROCEDURE — 85025 COMPLETE CBC W/AUTO DIFF WBC: CPT | Performed by: NURSE PRACTITIONER

## 2025-08-24 PROCEDURE — 96365 THER/PROPH/DIAG IV INF INIT: CPT

## 2025-08-24 PROCEDURE — 25010000002 ENOXAPARIN PER 10 MG: Performed by: STUDENT IN AN ORGANIZED HEALTH CARE EDUCATION/TRAINING PROGRAM

## 2025-08-24 PROCEDURE — 96376 TX/PRO/DX INJ SAME DRUG ADON: CPT

## 2025-08-24 PROCEDURE — 93306 TTE W/DOPPLER COMPLETE: CPT | Performed by: STUDENT IN AN ORGANIZED HEALTH CARE EDUCATION/TRAINING PROGRAM

## 2025-08-24 PROCEDURE — 25010000002 HEPARIN (PORCINE) 25000-0.45 UT/250ML-% SOLUTION: Performed by: STUDENT IN AN ORGANIZED HEALTH CARE EDUCATION/TRAINING PROGRAM

## 2025-08-24 PROCEDURE — 93356 MYOCRD STRAIN IMG SPCKL TRCK: CPT

## 2025-08-24 RX ORDER — HEPARIN SODIUM 10000 [USP'U]/100ML
11.8 INJECTION, SOLUTION INTRAVENOUS
Status: DISCONTINUED | OUTPATIENT
Start: 2025-08-24 | End: 2025-08-25

## 2025-08-24 RX ORDER — ROSUVASTATIN CALCIUM 10 MG/1
10 TABLET, COATED ORAL NIGHTLY
Status: DISCONTINUED | OUTPATIENT
Start: 2025-08-24 | End: 2025-08-26 | Stop reason: HOSPADM

## 2025-08-24 RX ORDER — POTASSIUM CHLORIDE 1500 MG/1
40 TABLET, EXTENDED RELEASE ORAL EVERY 4 HOURS
Status: DISPENSED | OUTPATIENT
Start: 2025-08-24 | End: 2025-08-24

## 2025-08-24 RX ORDER — LOSARTAN POTASSIUM 50 MG/1
50 TABLET ORAL
Status: DISCONTINUED | OUTPATIENT
Start: 2025-08-24 | End: 2025-08-26

## 2025-08-24 RX ORDER — CARVEDILOL 6.25 MG/1
6.25 TABLET ORAL 2 TIMES DAILY WITH MEALS
Status: DISCONTINUED | OUTPATIENT
Start: 2025-08-24 | End: 2025-08-26 | Stop reason: HOSPADM

## 2025-08-24 RX ORDER — FUROSEMIDE 10 MG/ML
40 INJECTION INTRAMUSCULAR; INTRAVENOUS EVERY 12 HOURS
Status: DISCONTINUED | OUTPATIENT
Start: 2025-08-24 | End: 2025-08-24

## 2025-08-24 RX ORDER — FUROSEMIDE 10 MG/ML
20 INJECTION INTRAMUSCULAR; INTRAVENOUS DAILY
Status: DISCONTINUED | OUTPATIENT
Start: 2025-08-24 | End: 2025-08-26 | Stop reason: HOSPADM

## 2025-08-24 RX ADMIN — PANTOPRAZOLE SODIUM 40 MG: 40 TABLET, DELAYED RELEASE ORAL at 07:44

## 2025-08-24 RX ADMIN — POTASSIUM CHLORIDE 40 MEQ: 1500 TABLET, EXTENDED RELEASE ORAL at 07:44

## 2025-08-24 RX ADMIN — HEPARIN SODIUM 11.8 UNITS/KG/HR: 10000 INJECTION, SOLUTION INTRAVENOUS at 23:40

## 2025-08-24 RX ADMIN — ENOXAPARIN SODIUM 40 MG: 100 INJECTION SUBCUTANEOUS at 00:20

## 2025-08-24 RX ADMIN — CARVEDILOL 6.25 MG: 6.25 TABLET, FILM COATED ORAL at 17:53

## 2025-08-24 RX ADMIN — METOPROLOL TARTRATE 25 MG: 25 TABLET, FILM COATED ORAL at 07:43

## 2025-08-24 RX ADMIN — Medication 10 ML: at 12:56

## 2025-08-24 RX ADMIN — LOSARTAN POTASSIUM 50 MG: 50 TABLET, FILM COATED ORAL at 18:11

## 2025-08-24 RX ADMIN — ASPIRIN 81 MG: 81 TABLET, CHEWABLE ORAL at 00:20

## 2025-08-24 RX ADMIN — Medication 10 ML: at 20:35

## 2025-08-24 RX ADMIN — FUROSEMIDE 20 MG: 10 INJECTION, SOLUTION INTRAMUSCULAR; INTRAVENOUS at 12:40

## 2025-08-24 RX ADMIN — ROSUVASTATIN CALCIUM 10 MG: 10 TABLET, FILM COATED ORAL at 17:53

## 2025-08-25 LAB
BASE DEFICIT: ABNORMAL
BASE DEFICIT: ABNORMAL
BASE EXCESS BLDA CALC-SCNC: 4 MMOL/L (ref 0–3)
BASE EXCESS BLDV CALC-SCNC: 2 MMOL/L (ref 0–3)
CA-I BLDA-SCNC: 1.17 MMOL/L (ref 1.12–1.32)
CA-I BLDA-SCNC: 1.18 MMOL/L (ref 1.12–1.32)
CO2 BLDA-SCNC: 30 MMOL/L (ref 23–27)
CO2 CONTENT VENOUS: 29 MMOL/L (ref 24–29)
GLUCOSE BLDC GLUCOMTR-MCNC: 114 MG/DL (ref 70–105)
GLUCOSE BLDC GLUCOMTR-MCNC: 115 MG/DL (ref 70–105)
HCO3 BLDA-SCNC: 28.9 MMOL/L (ref 22–26)
HCO3 BLDV-SCNC: 27.8 MMOL/L (ref 23–28)
HCT VFR BLDA CALC: 57 % (ref 38–51)
HCT VFR BLDA CALC: 58 % (ref 38–51)
HGB BLDA-MCNC: 19.4 G/DL (ref 12–17)
HGB BLDA-MCNC: 19.7 G/DL (ref 12–17)
INR PPP: 1.07 (ref 0.9–1.1)
PCO2 BLDA: 47 MM HG (ref 35–45)
PCO2 BLDV: 48.4 MM HG (ref 41–51)
PH BLDA: 7.4 PH UNITS (ref 7.35–7.45)
PH BLDV: 7.37 PH UNITS (ref 7.31–7.41)
PO2 BLDA: 67 MM HG (ref 80–105)
PO2 BLDV: 37 MM HG (ref 35–42)
POTASSIUM BLDA-SCNC: 4.3 MMOL/L (ref 3.5–4.9)
POTASSIUM BLDA-SCNC: 4.5 MMOL/L (ref 3.5–4.9)
PROTHROMBIN TIME: 13.8 SECONDS (ref 11.7–14.2)
SAO2 % BLDCOA: 93 % (ref 95–98)
SAO2 % BLDCOV: ABNORMAL %
SODIUM BLD-SCNC: 138 MMOL/L (ref 138–146)
SODIUM BLD-SCNC: 139 MMOL/L (ref 138–146)
UFH PPP CHRO-ACNC: 0.4 IU/ML
UFH PPP CHRO-ACNC: <0.1 IU/ML

## 2025-08-25 PROCEDURE — 80048 BASIC METABOLIC PNL TOTAL CA: CPT | Performed by: INTERNAL MEDICINE

## 2025-08-25 PROCEDURE — C1894 INTRO/SHEATH, NON-LASER: HCPCS | Performed by: INTERNAL MEDICINE

## 2025-08-25 PROCEDURE — 94799 UNLISTED PULMONARY SVC/PX: CPT

## 2025-08-25 PROCEDURE — G0378 HOSPITAL OBSERVATION PER HR: HCPCS

## 2025-08-25 PROCEDURE — 84295 ASSAY OF SERUM SODIUM: CPT

## 2025-08-25 PROCEDURE — 96366 THER/PROPH/DIAG IV INF ADDON: CPT

## 2025-08-25 PROCEDURE — 25010000002 NITROGLYCERIN 5 MG/ML SOLUTION: Performed by: INTERNAL MEDICINE

## 2025-08-25 PROCEDURE — 94664 DEMO&/EVAL PT USE INHALER: CPT

## 2025-08-25 PROCEDURE — 85014 HEMATOCRIT: CPT

## 2025-08-25 PROCEDURE — C1751 CATH, INF, PER/CENT/MIDLINE: HCPCS | Performed by: INTERNAL MEDICINE

## 2025-08-25 PROCEDURE — 84132 ASSAY OF SERUM POTASSIUM: CPT

## 2025-08-25 PROCEDURE — 25510000001 IOPAMIDOL PER 1 ML: Performed by: INTERNAL MEDICINE

## 2025-08-25 PROCEDURE — 93460 R&L HRT ART/VENTRICLE ANGIO: CPT | Performed by: INTERNAL MEDICINE

## 2025-08-25 PROCEDURE — 82330 ASSAY OF CALCIUM: CPT

## 2025-08-25 PROCEDURE — 25010000002 MIDAZOLAM PER 1 MG: Performed by: INTERNAL MEDICINE

## 2025-08-25 PROCEDURE — 82803 BLOOD GASES ANY COMBINATION: CPT

## 2025-08-25 PROCEDURE — 94761 N-INVAS EAR/PLS OXIMETRY MLT: CPT

## 2025-08-25 PROCEDURE — C1769 GUIDE WIRE: HCPCS | Performed by: INTERNAL MEDICINE

## 2025-08-25 PROCEDURE — 25010000002 LIDOCAINE 2% SOLUTION: Performed by: INTERNAL MEDICINE

## 2025-08-25 PROCEDURE — 25010000002 HEPARIN (PORCINE) PER 1000 UNITS: Performed by: INTERNAL MEDICINE

## 2025-08-25 PROCEDURE — 82947 ASSAY GLUCOSE BLOOD QUANT: CPT

## 2025-08-25 PROCEDURE — 99152 MOD SED SAME PHYS/QHP 5/>YRS: CPT | Performed by: INTERNAL MEDICINE

## 2025-08-25 PROCEDURE — 25010000002 FENTANYL CITRATE (PF) 100 MCG/2ML SOLUTION: Performed by: INTERNAL MEDICINE

## 2025-08-25 PROCEDURE — 85520 HEPARIN ASSAY: CPT | Performed by: STUDENT IN AN ORGANIZED HEALTH CARE EDUCATION/TRAINING PROGRAM

## 2025-08-25 PROCEDURE — 85610 PROTHROMBIN TIME: CPT | Performed by: STUDENT IN AN ORGANIZED HEALTH CARE EDUCATION/TRAINING PROGRAM

## 2025-08-25 PROCEDURE — 25010000002 NICARDIPINE 2.5 MG/ML SOLUTION: Performed by: INTERNAL MEDICINE

## 2025-08-25 RX ORDER — NICARDIPINE HYDROCHLORIDE 2.5 MG/ML
INJECTION INTRAVENOUS
Status: DISCONTINUED | OUTPATIENT
Start: 2025-08-25 | End: 2025-08-25 | Stop reason: HOSPADM

## 2025-08-25 RX ORDER — HEPARIN SODIUM 1000 [USP'U]/ML
INJECTION, SOLUTION INTRAVENOUS; SUBCUTANEOUS
Status: DISCONTINUED | OUTPATIENT
Start: 2025-08-25 | End: 2025-08-25 | Stop reason: HOSPADM

## 2025-08-25 RX ORDER — ONDANSETRON 4 MG/1
4 TABLET, ORALLY DISINTEGRATING ORAL EVERY 6 HOURS PRN
Status: DISCONTINUED | OUTPATIENT
Start: 2025-08-25 | End: 2025-08-26 | Stop reason: HOSPADM

## 2025-08-25 RX ORDER — NICOTINE 21 MG/24HR
1 PATCH, TRANSDERMAL 24 HOURS TRANSDERMAL
Status: DISCONTINUED | OUTPATIENT
Start: 2025-08-25 | End: 2025-08-25

## 2025-08-25 RX ORDER — LIDOCAINE HYDROCHLORIDE 20 MG/ML
INJECTION, SOLUTION INFILTRATION; PERINEURAL
Status: DISCONTINUED | OUTPATIENT
Start: 2025-08-25 | End: 2025-08-25 | Stop reason: HOSPADM

## 2025-08-25 RX ORDER — NICOTINE 21 MG/24HR
1 PATCH, TRANSDERMAL 24 HOURS TRANSDERMAL
Status: DISCONTINUED | OUTPATIENT
Start: 2025-08-25 | End: 2025-08-26 | Stop reason: HOSPADM

## 2025-08-25 RX ORDER — NITROGLYCERIN 5 MG/ML
INJECTION, SOLUTION INTRAVENOUS
Status: DISCONTINUED | OUTPATIENT
Start: 2025-08-25 | End: 2025-08-25 | Stop reason: HOSPADM

## 2025-08-25 RX ORDER — DIPHENHYDRAMINE HCL 25 MG
25 CAPSULE ORAL EVERY 6 HOURS PRN
Status: DISCONTINUED | OUTPATIENT
Start: 2025-08-25 | End: 2025-08-26 | Stop reason: HOSPADM

## 2025-08-25 RX ORDER — IPRATROPIUM BROMIDE AND ALBUTEROL SULFATE 2.5; .5 MG/3ML; MG/3ML
3 SOLUTION RESPIRATORY (INHALATION) EVERY 4 HOURS PRN
Status: DISCONTINUED | OUTPATIENT
Start: 2025-08-25 | End: 2025-08-26 | Stop reason: HOSPADM

## 2025-08-25 RX ORDER — IOPAMIDOL 755 MG/ML
INJECTION, SOLUTION INTRAVASCULAR
Status: DISCONTINUED | OUTPATIENT
Start: 2025-08-25 | End: 2025-08-25 | Stop reason: HOSPADM

## 2025-08-25 RX ORDER — ACETAMINOPHEN 325 MG/1
650 TABLET ORAL EVERY 4 HOURS PRN
Status: DISCONTINUED | OUTPATIENT
Start: 2025-08-25 | End: 2025-08-26 | Stop reason: HOSPADM

## 2025-08-25 RX ORDER — NITROGLYCERIN 0.4 MG/1
0.4 TABLET SUBLINGUAL
Status: DISCONTINUED | OUTPATIENT
Start: 2025-08-25 | End: 2025-08-26 | Stop reason: HOSPADM

## 2025-08-25 RX ORDER — FENTANYL CITRATE 50 UG/ML
INJECTION, SOLUTION INTRAMUSCULAR; INTRAVENOUS
Status: DISCONTINUED | OUTPATIENT
Start: 2025-08-25 | End: 2025-08-25 | Stop reason: HOSPADM

## 2025-08-25 RX ORDER — ONDANSETRON 2 MG/ML
4 INJECTION INTRAMUSCULAR; INTRAVENOUS EVERY 6 HOURS PRN
Status: DISCONTINUED | OUTPATIENT
Start: 2025-08-25 | End: 2025-08-26 | Stop reason: HOSPADM

## 2025-08-25 RX ORDER — MIDAZOLAM HYDROCHLORIDE 1 MG/ML
INJECTION, SOLUTION INTRAMUSCULAR; INTRAVENOUS
Status: DISCONTINUED | OUTPATIENT
Start: 2025-08-25 | End: 2025-08-25 | Stop reason: HOSPADM

## 2025-08-25 RX ADMIN — Medication 10 ML: at 08:17

## 2025-08-25 RX ADMIN — ROSUVASTATIN CALCIUM 10 MG: 10 TABLET, FILM COATED ORAL at 20:41

## 2025-08-25 RX ADMIN — LOSARTAN POTASSIUM 50 MG: 50 TABLET, FILM COATED ORAL at 08:16

## 2025-08-25 RX ADMIN — CARVEDILOL 6.25 MG: 6.25 TABLET, FILM COATED ORAL at 08:16

## 2025-08-25 RX ADMIN — PANTOPRAZOLE SODIUM 40 MG: 40 TABLET, DELAYED RELEASE ORAL at 08:16

## 2025-08-25 RX ADMIN — NICOTINE 1 PATCH: 14 PATCH TRANSDERMAL at 12:56

## 2025-08-25 RX ADMIN — NICOTINE 1 PATCH: 14 PATCH TRANSDERMAL at 06:30

## 2025-08-25 RX ADMIN — CARVEDILOL 6.25 MG: 6.25 TABLET, FILM COATED ORAL at 17:32

## 2025-08-25 RX ADMIN — ASPIRIN 81 MG: 81 TABLET, CHEWABLE ORAL at 08:16

## 2025-08-25 RX ADMIN — Medication 10 ML: at 20:46

## 2025-08-26 VITALS
TEMPERATURE: 97.3 F | HEART RATE: 95 BPM | SYSTOLIC BLOOD PRESSURE: 133 MMHG | DIASTOLIC BLOOD PRESSURE: 97 MMHG | WEIGHT: 191.2 LBS | BODY MASS INDEX: 27.37 KG/M2 | RESPIRATION RATE: 13 BRPM | OXYGEN SATURATION: 92 % | HEIGHT: 70 IN

## 2025-08-26 PROBLEM — I50.21 ACUTE HFREF (HEART FAILURE WITH REDUCED EJECTION FRACTION): Status: ACTIVE | Noted: 2025-08-26

## 2025-08-26 LAB
ANION GAP SERPL CALCULATED.3IONS-SCNC: 11.5 MMOL/L (ref 5–15)
BUN SERPL-MCNC: 21.5 MG/DL (ref 8–23)
BUN/CREAT SERPL: 17.3 (ref 7–25)
CALCIUM SPEC-SCNC: 8.6 MG/DL (ref 8.6–10.5)
CHLORIDE SERPL-SCNC: 103 MMOL/L (ref 98–107)
CO2 SERPL-SCNC: 22.5 MMOL/L (ref 22–29)
CREAT SERPL-MCNC: 1.24 MG/DL (ref 0.76–1.27)
EGFRCR SERPLBLD CKD-EPI 2021: 65.3 ML/MIN/1.73
GLUCOSE SERPL-MCNC: 93 MG/DL (ref 65–99)
POTASSIUM SERPL-SCNC: 4.4 MMOL/L (ref 3.5–5.2)
QT INTERVAL: 398 MS
QTC INTERVAL: 464 MS
SODIUM SERPL-SCNC: 137 MMOL/L (ref 136–145)

## 2025-08-26 PROCEDURE — G0378 HOSPITAL OBSERVATION PER HR: HCPCS

## 2025-08-26 PROCEDURE — 25010000002 FUROSEMIDE PER 20 MG: Performed by: INTERNAL MEDICINE

## 2025-08-26 RX ORDER — NICOTINE 21 MG/24HR
1 PATCH, TRANSDERMAL 24 HOURS TRANSDERMAL
Qty: 28 PATCH | Refills: 2 | Status: SHIPPED | OUTPATIENT
Start: 2025-08-27

## 2025-08-26 RX ORDER — LOSARTAN POTASSIUM 50 MG/1
50 TABLET ORAL
Qty: 30 TABLET | Refills: 0 | Status: SHIPPED | OUTPATIENT
Start: 2025-08-27 | End: 2025-09-26

## 2025-08-26 RX ORDER — SPIRONOLACTONE 25 MG/1
12.5 TABLET ORAL DAILY
Qty: 15 TABLET | Refills: 0 | Status: SHIPPED | OUTPATIENT
Start: 2025-08-26 | End: 2025-09-25

## 2025-08-26 RX ORDER — ROSUVASTATIN CALCIUM 10 MG/1
10 TABLET, COATED ORAL NIGHTLY
Qty: 90 TABLET | Refills: 1 | Status: SHIPPED | OUTPATIENT
Start: 2025-08-26

## 2025-08-26 RX ORDER — LOSARTAN POTASSIUM 50 MG/1
50 TABLET ORAL
Status: DISCONTINUED | OUTPATIENT
Start: 2025-08-26 | End: 2025-08-26 | Stop reason: HOSPADM

## 2025-08-26 RX ORDER — CARVEDILOL 6.25 MG/1
6.25 TABLET ORAL 2 TIMES DAILY WITH MEALS
Qty: 60 TABLET | Refills: 0 | Status: SHIPPED | OUTPATIENT
Start: 2025-08-26 | End: 2025-09-25

## 2025-08-26 RX ADMIN — FUROSEMIDE 20 MG: 10 INJECTION, SOLUTION INTRAMUSCULAR; INTRAVENOUS at 09:47

## 2025-08-26 RX ADMIN — PANTOPRAZOLE SODIUM 40 MG: 40 TABLET, DELAYED RELEASE ORAL at 09:46

## 2025-08-26 RX ADMIN — NICOTINE 1 PATCH: 14 PATCH TRANSDERMAL at 09:46

## 2025-08-26 RX ADMIN — CARVEDILOL 6.25 MG: 6.25 TABLET, FILM COATED ORAL at 09:46

## 2025-08-26 RX ADMIN — LOSARTAN POTASSIUM 50 MG: 50 TABLET, FILM COATED ORAL at 05:42

## 2025-08-26 RX ADMIN — EMPAGLIFLOZIN 10 MG: 10 TABLET, FILM COATED ORAL at 09:46

## 2025-08-26 RX ADMIN — Medication 10 ML: at 09:47

## 2025-08-27 ENCOUNTER — TELEPHONE (OUTPATIENT)
Dept: CARDIOLOGY | Facility: CLINIC | Age: 65
End: 2025-08-27
Payer: COMMERCIAL

## 2025-08-27 ENCOUNTER — READMISSION MANAGEMENT (OUTPATIENT)
Dept: CALL CENTER | Facility: HOSPITAL | Age: 65
End: 2025-08-27
Payer: COMMERCIAL

## (undated) DEVICE — DGW .035 FC J3MM 260CM TEF: Brand: EMERALD

## (undated) DEVICE — PINNACLE INTRODUCER SHEATH: Brand: PINNACLE

## (undated) DEVICE — ELECTRD DEFIB M/FUNC PROPADZ RADIOL 2PK

## (undated) DEVICE — ST ACC MICROPUNCTURE STFF/CANN PLAT/TP 4F 21G 40CM

## (undated) DEVICE — CATH DIAG IMPULSE FL3.5 6F 100CM

## (undated) DEVICE — SWAN-GANZ THERMODILUTION CATHETER: Brand: SWAN-GANZ

## (undated) DEVICE — CATH DIAG IMPULSE FR4 6F 100CM

## (undated) DEVICE — GLIDESHEATH SLENDER ACCESS KIT: Brand: GLIDESHEATH SLENDER

## (undated) DEVICE — PK TRY HEART CATH 50

## (undated) DEVICE — CVR PROB ULTRASND CIVFLEX GEN/PURP TELESCP/FOLD 5.5X96IN LF

## (undated) DEVICE — TR BAND RADIAL ARTERY COMPRESSION DEVICE: Brand: TR BAND